# Patient Record
Sex: FEMALE | Race: WHITE | NOT HISPANIC OR LATINO | Employment: FULL TIME | URBAN - METROPOLITAN AREA
[De-identification: names, ages, dates, MRNs, and addresses within clinical notes are randomized per-mention and may not be internally consistent; named-entity substitution may affect disease eponyms.]

---

## 2017-09-15 ENCOUNTER — OFFICE VISIT (OUTPATIENT)
Dept: INTERNAL MEDICINE | Facility: CLINIC | Age: 47
End: 2017-09-15

## 2017-09-15 VITALS
DIASTOLIC BLOOD PRESSURE: 80 MMHG | WEIGHT: 159 LBS | BODY MASS INDEX: 26.49 KG/M2 | SYSTOLIC BLOOD PRESSURE: 122 MMHG | OXYGEN SATURATION: 98 % | HEIGHT: 65 IN | HEART RATE: 95 BPM

## 2017-09-15 DIAGNOSIS — J06.9 ACUTE URI: Primary | ICD-10-CM

## 2017-09-15 DIAGNOSIS — J45.21 MILD INTERMITTENT ASTHMA WITH ACUTE EXACERBATION: ICD-10-CM

## 2017-09-15 PROCEDURE — 99203 OFFICE O/P NEW LOW 30 MIN: CPT | Performed by: NURSE PRACTITIONER

## 2017-09-15 RX ORDER — AZITHROMYCIN 250 MG/1
TABLET, FILM COATED ORAL
Qty: 6 TABLET | Refills: 0 | Status: SHIPPED | OUTPATIENT
Start: 2017-09-15 | End: 2018-05-17

## 2017-09-15 RX ORDER — CETIRIZINE HYDROCHLORIDE 10 MG/1
10 TABLET ORAL DAILY
COMMUNITY
End: 2021-10-26

## 2017-09-15 RX ORDER — ALBUTEROL SULFATE 90 UG/1
2 AEROSOL, METERED RESPIRATORY (INHALATION) EVERY 4 HOURS PRN
Qty: 8 G | Refills: 6 | Status: SHIPPED | OUTPATIENT
Start: 2017-09-15 | End: 2020-08-14 | Stop reason: SDUPTHER

## 2017-09-15 RX ORDER — DIPHENHYDRAMINE HCL 25 MG
25 CAPSULE ORAL EVERY 6 HOURS PRN
COMMUNITY
End: 2017-09-15

## 2017-09-15 RX ORDER — PROMETHAZINE HYDROCHLORIDE AND CODEINE PHOSPHATE 6.25; 1 MG/5ML; MG/5ML
5 SYRUP ORAL EVERY 6 HOURS PRN
Qty: 180 ML | Refills: 0 | Status: SHIPPED | OUTPATIENT
Start: 2017-09-15 | End: 2018-05-17

## 2017-09-15 RX ORDER — PSEUDOEPHEDRINE HCL 30 MG
30 TABLET ORAL EVERY 4 HOURS PRN
COMMUNITY
End: 2018-05-17

## 2017-09-15 RX ORDER — FLUTICASONE PROPIONATE 50 MCG
1 SPRAY, SUSPENSION (ML) NASAL DAILY
COMMUNITY
End: 2021-10-26 | Stop reason: ALTCHOICE

## 2017-09-15 NOTE — PROGRESS NOTES
Subjective   Maranda Gillis is a 47 y.o. female. Patient is here today for   Chief Complaint   Patient presents with   • Allergies     Pt complains of having all-year allergies. Pt has had a productive cough x1 week & she thinks this is allergy related.    .    History of Present Illness   New patient here to establish care.  Health history and questionnaire have been reviewed in its entirety. The patient's previous primary care provider was Lexus SARGENT.  C/o cough x 1 week associated with chest congestion. Her cough is occasionally productive.  Denies shortness of breath or wheezing, although she is using Albuterol every 4 - 6 hours. Denies fever. She has had sick contacts. Feels like it is more in her chest. Not able to sleep due to cough. Cheratussin has helped her to sleep. She had an old prescription. She was on    Singulair in the past, but she started having hallucinations.    The following portions of the patient's history were reviewed and updated as appropriate: allergies, current medications, past family history, past medical history, past social history, past surgical history and problem list.    Review of Systems   Constitutional: Positive for fatigue.   HENT: Positive for congestion. Negative for postnasal drip, sinus pressure and sore throat.    Respiratory: Positive for cough. Negative for shortness of breath and wheezing.    Cardiovascular: Negative.    Gastrointestinal: Negative.        Objective   Vitals:    09/15/17 1257   BP: 122/80   Pulse: 95   SpO2: 98%     Physical Exam   Constitutional: Vital signs are normal. She appears well-developed and well-nourished.   HENT:   Right Ear: Tympanic membrane and ear canal normal.   Left Ear: Tympanic membrane and ear canal normal.   Mouth/Throat: Mucous membranes are normal. Posterior oropharyngeal erythema present.   Cardiovascular: Normal rate, regular rhythm and normal heart sounds.    Pulmonary/Chest: Effort normal and breath sounds normal.    Lymphadenopathy:     She has no cervical adenopathy.   Skin: Skin is warm, dry and intact.   Psychiatric: She has a normal mood and affect. Her speech is normal and behavior is normal. Thought content normal.   Nursing note and vitals reviewed.      Assessment/Plan   Maranda was seen today for allergies.    Diagnoses and all orders for this visit:    Acute URI  -     azithromycin (ZITHROMAX) 250 MG tablet; Take 2 tablets the first day, then 1 tablet daily for 4 days.  -     promethazine-codeine (PHENERGAN with CODEINE) 6.25-10 MG/5ML syrup; Take 5 mL by mouth Every 6 (Six) Hours As Needed for Cough.    Mild intermittent asthma with acute exacerbation  -     beclomethasone (QVAR) 80 MCG/ACT inhaler; Inhale 1 puff 2 (Two) Times a Day.  -     albuterol (PROVENTIL HFA;VENTOLIN HFA) 108 (90 Base) MCG/ACT inhaler; Inhale 2 puffs Every 4 (Four) Hours As Needed for Wheezing.    Patient will need to schedule an appointment for fasting labs and a physical once she is feeling better.

## 2018-05-17 ENCOUNTER — OFFICE VISIT (OUTPATIENT)
Dept: INTERNAL MEDICINE | Facility: CLINIC | Age: 48
End: 2018-05-17

## 2018-05-17 VITALS
BODY MASS INDEX: 28.02 KG/M2 | OXYGEN SATURATION: 98 % | SYSTOLIC BLOOD PRESSURE: 118 MMHG | DIASTOLIC BLOOD PRESSURE: 78 MMHG | TEMPERATURE: 98.7 F | HEART RATE: 91 BPM | WEIGHT: 168.19 LBS | HEIGHT: 65 IN

## 2018-05-17 DIAGNOSIS — J40 BRONCHITIS: ICD-10-CM

## 2018-05-17 DIAGNOSIS — J02.9 SORE THROAT: Primary | ICD-10-CM

## 2018-05-17 LAB
EXPIRATION DATE: NORMAL
INTERNAL CONTROL: NORMAL
Lab: NORMAL
S PYO AG THROAT QL: NEGATIVE

## 2018-05-17 PROCEDURE — 99213 OFFICE O/P EST LOW 20 MIN: CPT | Performed by: NURSE PRACTITIONER

## 2018-05-17 PROCEDURE — 87880 STREP A ASSAY W/OPTIC: CPT | Performed by: NURSE PRACTITIONER

## 2018-05-17 RX ORDER — METHYLPREDNISOLONE 4 MG/1
TABLET ORAL
Qty: 21 TABLET | Refills: 0 | Status: SHIPPED | OUTPATIENT
Start: 2018-05-17 | End: 2019-06-26

## 2018-05-17 RX ORDER — AZITHROMYCIN 250 MG/1
TABLET, FILM COATED ORAL
Qty: 6 TABLET | Refills: 0 | Status: SHIPPED | OUTPATIENT
Start: 2018-05-17 | End: 2019-06-26

## 2018-05-17 NOTE — PROGRESS NOTES
Subjective   Maranda Gillis is a 48 y.o. female. Patient is here today for   Chief Complaint   Patient presents with   • URI     Pt complains of having ST, HA & productive cough x1 week. Pt has been taking IBU, benadryl, flonase & zyrtec.    .    History of Present Illness   C/o sore throat x 1 weeks associated with chest congestion, cough. Cough is occasionally productive. Promethazine with codeine is not helping. She did have allergy testing in the past, but not allergy injections. Qvar daily. Denies wheezing or shortness of breath. Denies fever.    The following portions of the patient's history were reviewed and updated as appropriate: allergies, current medications, past family history, past medical history, past social history, past surgical history and problem list.    Review of Systems   Constitutional: Negative.    HENT: Positive for congestion and sore throat.    Respiratory: Positive for cough.    Cardiovascular: Negative.    Neurological: Positive for headaches.       Objective   Vitals:    05/17/18 0950   BP: 118/78   Pulse: 91   Temp: 98.7 °F (37.1 °C)   SpO2: 98%     Results for orders placed or performed in visit on 05/17/18   POCT rapid strep A   Result Value Ref Range    Rapid Strep A Screen Negative Negative, VALID, INVALID, Not Performed    Internal Control Passed Passed    Lot Number [id number]     Expiration Date 12/31/2019        Physical Exam   Constitutional: She is oriented to person, place, and time. Vital signs are normal. She appears well-developed and well-nourished.   HENT:   Right Ear: Ear canal normal. A middle ear effusion is present.   Left Ear: Ear canal normal. A middle ear effusion is present.   Mouth/Throat: Posterior oropharyngeal erythema present.   Cardiovascular: Normal rate, regular rhythm and normal heart sounds.    Pulmonary/Chest: Effort normal and breath sounds normal.   Neurological: She is alert and oriented to person, place, and time.   Skin: Skin is warm, dry and  intact.   Psychiatric: She has a normal mood and affect. Her speech is normal and behavior is normal. Thought content normal.   Nursing note and vitals reviewed.      Assessment/Plan   Maranda was seen today for uri.    Diagnoses and all orders for this visit:    Sore throat  -     POCT rapid strep A    Bronchitis  -     MethylPREDNISolone (MEDROL) 4 MG tablet; follow package directions  -     HYDROcod Polst-CPM Polst ER (TUSSIONEX PENNKINETIC ER) 10-8 MG/5ML ER suspension; Take 5 mL by mouth Every 12 (Twelve) Hours As Needed for Cough.  -     azithromycin (ZITHROMAX) 250 MG tablet; Take 2 tablets the first day, then 1 tablet daily for 4 days.        Change zyrtec to allegra to help improve allergy symptoms. She may need to follow up with her allergist.     Zithromax sent in to pharmacy for patient to take if her symptoms do not improve over the weekend. She does not need to start it today.

## 2018-09-17 DIAGNOSIS — J45.21 MILD INTERMITTENT ASTHMA WITH ACUTE EXACERBATION: ICD-10-CM

## 2018-09-18 NOTE — TELEPHONE ENCOUNTER
Pts pharmacy called to let us know that they no longer make a QVAR inhaler but to change this to the QVAR ready inhaler.

## 2019-03-07 ENCOUNTER — TELEPHONE (OUTPATIENT)
Dept: INTERNAL MEDICINE | Facility: CLINIC | Age: 49
End: 2019-03-07

## 2019-03-07 DIAGNOSIS — R92.8 ABNORMAL MAMMOGRAM OF LEFT BREAST: Primary | ICD-10-CM

## 2019-03-07 NOTE — TELEPHONE ENCOUNTER
----- Message from Maria Esther Howe MA sent at 3/7/2019  6:15 AM EST -----  Regarding: FW: ORDER  Please advise if this is okay.     ----- Message -----  From: Althea Sanchez RegSched Rep  Sent: 3/6/2019  10:32 AM  To: Maria Esther Howe MA  Subject: ORDER                                            [facility] called stating patient had a mammogram at one of their mobile units in November and they are going to fax over that report.  That study showed a left breast focal asymmetry and she is scheduled for this Friday at 10 am for a left diagnostic mammogram.  They are asking you to put that Order in and fax it to them at [phone] and to put on there U/S and biopsy if needed.

## 2019-03-07 NOTE — TELEPHONE ENCOUNTER
----- Message from ARIE Vasquez sent at 3/7/2019 12:53 PM EST -----  Regarding: RE: ORDER  I placed the order. Why did it take over 3 months for them to send the results? This is why a patient shouldn't be able to get a mammogram without an actual order. I've never even ordered a regular mammogram on her because she's only been seen for acute issues.     ----- Message -----  From: Maria Esther Howe MA  Sent: 3/7/2019   6:15 AM  To: ARIE Vasquez  Subject: FW: ORDER                                        Please advise if this is okay.     ----- Message -----  From: Althea Sanchez RegSched Rep  Sent: 3/6/2019  10:32 AM  To: Maria Esther Howe MA  Subject: ORDER                                            [facility] called stating patient had a mammogram at one of their mobile units in November and they are going to fax over that report.  That study showed a left breast focal asymmetry and she is scheduled for this Friday at 10 am for a left diagnostic mammogram.  They are asking you to put that Order in and fax it to them at [phone] and to put on there U/S and biopsy if needed.

## 2019-06-26 ENCOUNTER — HOSPITAL ENCOUNTER (OUTPATIENT)
Dept: CT IMAGING | Facility: HOSPITAL | Age: 49
Discharge: HOME | End: 2019-06-26
Admitting: NURSE PRACTITIONER

## 2019-06-26 ENCOUNTER — OFFICE VISIT (OUTPATIENT)
Dept: INTERNAL MEDICINE | Facility: CLINIC | Age: 49
End: 2019-06-26

## 2019-06-26 VITALS
BODY MASS INDEX: 28.69 KG/M2 | OXYGEN SATURATION: 98 % | HEART RATE: 91 BPM | TEMPERATURE: 98.4 F | WEIGHT: 172.2 LBS | SYSTOLIC BLOOD PRESSURE: 110 MMHG | HEIGHT: 65 IN | DIASTOLIC BLOOD PRESSURE: 70 MMHG

## 2019-06-26 DIAGNOSIS — R10.9 RIGHT FLANK PAIN: ICD-10-CM

## 2019-06-26 DIAGNOSIS — N20.0 KIDNEY STONE: Primary | ICD-10-CM

## 2019-06-26 DIAGNOSIS — Z00.00 HEALTH CARE MAINTENANCE: ICD-10-CM

## 2019-06-26 DIAGNOSIS — R10.9 ABDOMINAL PAIN, UNSPECIFIED ABDOMINAL LOCATION: ICD-10-CM

## 2019-06-26 LAB
BILIRUB BLD-MCNC: NEGATIVE MG/DL
CLARITY, POC: CLEAR
COLOR UR: YELLOW
GLUCOSE UR STRIP-MCNC: NEGATIVE MG/DL
KETONES UR QL: ABNORMAL
LEUKOCYTE EST, POC: NEGATIVE
NITRITE UR-MCNC: NEGATIVE MG/ML
PH UR: 5.5 [PH] (ref 5–8)
PROT UR STRIP-MCNC: NEGATIVE MG/DL
RBC # UR STRIP: NEGATIVE /UL
SP GR UR: 1.02 (ref 1–1.03)
UROBILINOGEN UR QL: NORMAL

## 2019-06-26 PROCEDURE — 81003 URINALYSIS AUTO W/O SCOPE: CPT | Performed by: NURSE PRACTITIONER

## 2019-06-26 PROCEDURE — 74176 CT ABD & PELVIS W/O CONTRAST: CPT

## 2019-06-26 PROCEDURE — 99213 OFFICE O/P EST LOW 20 MIN: CPT | Performed by: NURSE PRACTITIONER

## 2019-06-27 ENCOUNTER — TELEPHONE (OUTPATIENT)
Dept: OBSTETRICS AND GYNECOLOGY | Facility: CLINIC | Age: 49
End: 2019-06-27

## 2019-06-27 LAB
25(OH)D3+25(OH)D2 SERPL-MCNC: 29 NG/ML (ref 30–100)
ALBUMIN SERPL-MCNC: 4.3 G/DL (ref 3.5–5.2)
ALBUMIN/GLOB SERPL: 1.5 G/DL
ALP SERPL-CCNC: 84 U/L (ref 39–117)
ALT SERPL-CCNC: 17 U/L (ref 1–33)
AMYLASE SERPL-CCNC: 55 U/L (ref 28–100)
AST SERPL-CCNC: 18 U/L (ref 1–32)
BASOPHILS # BLD AUTO: 0.02 10*3/MM3 (ref 0–0.2)
BASOPHILS NFR BLD AUTO: 0.2 % (ref 0–1.5)
BILIRUB SERPL-MCNC: 0.4 MG/DL (ref 0.2–1.2)
BUN SERPL-MCNC: 16 MG/DL (ref 6–20)
BUN/CREAT SERPL: 16.5 (ref 7–25)
CALCIUM SERPL-MCNC: 9.5 MG/DL (ref 8.6–10.5)
CHLORIDE SERPL-SCNC: 103 MMOL/L (ref 98–107)
CHOLEST SERPL-MCNC: 192 MG/DL (ref 0–200)
CO2 SERPL-SCNC: 26.3 MMOL/L (ref 22–29)
CREAT SERPL-MCNC: 0.97 MG/DL (ref 0.57–1)
EOSINOPHIL # BLD AUTO: 0.12 10*3/MM3 (ref 0–0.4)
EOSINOPHIL NFR BLD AUTO: 1.5 % (ref 0.3–6.2)
ERYTHROCYTE [DISTWIDTH] IN BLOOD BY AUTOMATED COUNT: 13.9 % (ref 12.3–15.4)
GLOBULIN SER CALC-MCNC: 2.9 GM/DL
GLUCOSE SERPL-MCNC: 87 MG/DL (ref 65–99)
HCT VFR BLD AUTO: 42.6 % (ref 34–46.6)
HDLC SERPL-MCNC: 89 MG/DL (ref 40–60)
HGB BLD-MCNC: 13.6 G/DL (ref 12–15.9)
IMM GRANULOCYTES # BLD AUTO: 0 10*3/MM3 (ref 0–0.05)
IMM GRANULOCYTES NFR BLD AUTO: 0 % (ref 0–0.5)
LDLC SERPL CALC-MCNC: 81 MG/DL (ref 0–100)
LDLC/HDLC SERPL: 0.91 {RATIO}
LIPASE SERPL-CCNC: 21 U/L (ref 13–60)
LYMPHOCYTES # BLD AUTO: 2.19 10*3/MM3 (ref 0.7–3.1)
LYMPHOCYTES NFR BLD AUTO: 26.5 % (ref 19.6–45.3)
MCH RBC QN AUTO: 31.3 PG (ref 26.6–33)
MCHC RBC AUTO-ENTMCNC: 31.9 G/DL (ref 31.5–35.7)
MCV RBC AUTO: 98.2 FL (ref 79–97)
MONOCYTES # BLD AUTO: 0.61 10*3/MM3 (ref 0.1–0.9)
MONOCYTES NFR BLD AUTO: 7.4 % (ref 5–12)
NEUTROPHILS # BLD AUTO: 5.32 10*3/MM3 (ref 1.7–7)
NEUTROPHILS NFR BLD AUTO: 64.4 % (ref 42.7–76)
PLATELET # BLD AUTO: 362 10*3/MM3 (ref 140–450)
POTASSIUM SERPL-SCNC: 4.6 MMOL/L (ref 3.5–5.2)
PROT SERPL-MCNC: 7.2 G/DL (ref 6–8.5)
RBC # BLD AUTO: 4.34 10*6/MM3 (ref 3.77–5.28)
SODIUM SERPL-SCNC: 140 MMOL/L (ref 136–145)
TRIGL SERPL-MCNC: 110 MG/DL (ref 0–150)
TSH SERPL DL<=0.005 MIU/L-ACNC: 1.66 MIU/ML (ref 0.27–4.2)
VLDLC SERPL CALC-MCNC: 22 MG/DL
WBC # BLD AUTO: 8.26 10*3/MM3 (ref 3.4–10.8)

## 2019-06-27 NOTE — TELEPHONE ENCOUNTER
New patient states her PCP found a cyst on her ovaries and she has had Pain come and go for the last 8 months. Do you want to work her in or wait til the next available?

## 2019-06-28 ENCOUNTER — TELEPHONE (OUTPATIENT)
Dept: INTERNAL MEDICINE | Facility: CLINIC | Age: 49
End: 2019-06-28

## 2019-06-28 DIAGNOSIS — R10.9 ABDOMINAL PAIN, UNSPECIFIED ABDOMINAL LOCATION: Primary | ICD-10-CM

## 2019-06-28 LAB
BACTERIA UR CULT: NORMAL
BACTERIA UR CULT: NORMAL

## 2019-06-28 NOTE — TELEPHONE ENCOUNTER
Suzanna Melo referred patient to GYN upstairs. Patient states they cannot see her until September.     [facility] I explained they have plenty of physicians in that office and that they may be able to get her in sooner.    Patient will call me once she has an appointment scheduled so I can fax CT scan to GYN.

## 2019-07-17 ENCOUNTER — PROCEDURE VISIT (OUTPATIENT)
Dept: OBSTETRICS AND GYNECOLOGY | Facility: CLINIC | Age: 49
End: 2019-07-17

## 2019-07-17 ENCOUNTER — OFFICE VISIT (OUTPATIENT)
Dept: OBSTETRICS AND GYNECOLOGY | Facility: CLINIC | Age: 49
End: 2019-07-17

## 2019-07-17 VITALS
WEIGHT: 170 LBS | BODY MASS INDEX: 28.32 KG/M2 | SYSTOLIC BLOOD PRESSURE: 110 MMHG | HEIGHT: 65 IN | DIASTOLIC BLOOD PRESSURE: 70 MMHG

## 2019-07-17 DIAGNOSIS — R10.2 PELVIC PAIN: Primary | ICD-10-CM

## 2019-07-17 DIAGNOSIS — R10.2 PELVIC PAIN IN FEMALE: Primary | ICD-10-CM

## 2019-07-17 DIAGNOSIS — N83.202 CYST OF LEFT OVARY: ICD-10-CM

## 2019-07-17 PROCEDURE — 76830 TRANSVAGINAL US NON-OB: CPT | Performed by: OBSTETRICS & GYNECOLOGY

## 2019-07-17 PROCEDURE — 99204 OFFICE O/P NEW MOD 45 MIN: CPT | Performed by: OBSTETRICS & GYNECOLOGY

## 2019-07-17 NOTE — PROGRESS NOTES
New GYN Exam    CC- Here for pelvic pain    Maranda Gillis is a 49 y.o. female new patient who presents for pelvic pain.  She has irregular cycles and will occasionally skip a month. She has had pelvic pain for the pst 8-9 months. This pain is low in her pelvis and radiates downwards. It has happened 3 times. The pain is severe and lasts for about 1 hour. The past time it happened it lasted for a few days. She had a CT scan on 19 showed a 4.1 cm L adnexal mass. US today shows a 9.3 cm uterus with EL 0.6 cm and a L ovarian cyst 3.8 x 3.3 x 2.5 cm and has complex features c/w hemorrhagic cyst.  She has not had a pap in > 3 years. She had migraines with OCPS.    OB History      Para Term  AB Living    2 1 1   1 1    SAB TAB Ectopic Molar Multiple Live Births    1                  Obstetric Comments    1   1 SAB no D&C          Menarche:12  Menopause:not yet  HRT:none  Current contraception: none  History of abnormal Pap smear: no  History of abnormal mammogram: no  Family history of uterine, colon or ovarian cancer: no  Family history of breast cancer: no  STD's: none  Last pap smear: > 3 years ago  Gardasil: none  PAUL: none      Health Maintenance   Topic Date Due   • ANNUAL PHYSICAL  1971   • TDAP/TD VACCINES (1 - Tdap) 1987   • PAP SMEAR  09/15/2017   • COLONOSCOPY  09/15/2017   • ZOSTER VACCINE (1 of 2) 2018   • INFLUENZA VACCINE  2019   • MAMMOGRAM  2020       Past Medical History:   Diagnosis Date   • Allergic    • Migraine     with OCPS   • Ovarian cyst        Past Surgical History:   Procedure Laterality Date   • BUNIONECTOMY      bilateral         Current Outpatient Medications:   •  albuterol (PROVENTIL HFA;VENTOLIN HFA) 108 (90 Base) MCG/ACT inhaler, Inhale 2 puffs Every 4 (Four) Hours As Needed for Wheezing., Disp: 8 g, Rfl: 6  •  Beclomethasone Diprop HFA (QVAR REDIHALER) 80 MCG/ACT inhaler, Inhale 1 puff 2 (Two) Times a Day., Disp: 10.6 g, Rfl: 2  •   "cetirizine (zyrTEC) 10 MG tablet, Take 10 mg by mouth Daily., Disp: , Rfl:   •  fluticasone (FLONASE) 50 MCG/ACT nasal spray, 1 spray into each nostril Daily., Disp: , Rfl:     Allergies   Allergen Reactions   • Guaifenesin-Codeine Other (See Comments)     \"I cant take that because it causes severe coughing fits and insomnia\"   • Other      ALLERGIC TO WEEDS, MOLDS, DUST MITES, GRASS, TREES, CATS   • Singulair [Montelukast Sodium] Hallucinations       Social History     Tobacco Use   • Smoking status: Never Smoker   • Smokeless tobacco: Never Used   Substance Use Topics   • Alcohol use: Yes     Comment: SOCIALLY   • Drug use: No       Family History   Problem Relation Age of Onset   • Diabetes Mother    • Melanoma Mother    • COPD Father    • Emphysema Father    • Asthma Sister    • Breast cancer Neg Hx    • Ovarian cancer Neg Hx    • Colon cancer Neg Hx    • Deep vein thrombosis Neg Hx    • Pulmonary embolism Neg Hx        Review of Systems   Constitutional: Negative for appetite change, fatigue, fever and unexpected weight change.   Eyes: Negative for photophobia and visual disturbance.   Respiratory: Negative for cough and shortness of breath.    Cardiovascular: Negative for chest pain and palpitations.   Gastrointestinal: Positive for abdominal pain. Negative for abdominal distention, constipation, diarrhea and nausea.   Endocrine: Negative for cold intolerance and heat intolerance.   Genitourinary: Positive for pelvic pain. Negative for dyspareunia, dysuria, menstrual problem and vaginal discharge.   Musculoskeletal: Positive for back pain.   Skin: Negative for color change and rash.   Neurological: Negative for headaches.   Hematological: Negative for adenopathy. Does not bruise/bleed easily.   Psychiatric/Behavioral: Negative for dysphoric mood. The patient is not nervous/anxious.        /70   Ht 165.1 cm (65\")   Wt 77.1 kg (170 lb)   LMP 07/15/2019   BMI 28.29 kg/m²     Physical Exam "   Constitutional: She is oriented to person, place, and time. She appears well-developed and well-nourished.   HENT:   Head: Normocephalic and atraumatic.   Eyes: Conjunctivae are normal. No scleral icterus.   Neck: Neck supple. No thyromegaly present.   Cardiovascular: Normal rate, regular rhythm and normal heart sounds.   Pulmonary/Chest: Effort normal and breath sounds normal.   Abdominal: Soft. Bowel sounds are normal. She exhibits no distension and no mass. There is tenderness (TTP LLQ ). There is no rebound and no guarding. No hernia.   Neurological: She is alert and oriented to person, place, and time.   Skin: Skin is warm and dry.   Psychiatric: She has a normal mood and affect. Her behavior is normal. Judgment and thought content normal.   Nursing note and vitals reviewed.         Assessment/Plan  1) Pelvic pain- L ovarian cyst on US. Pt has been having this pain > 8 months and it tends to come and go but has worsened recently. Pt has a complex L adnexal mass. RTO 6 weeks for repeat US and visit. If the patient still has a cyst at that time, consider dx lsc and ovarian cystectomy vs. LSO. Call sooner for any worsening pain.   2. RHM- RTO 6 weeks and will do annual as well.   3. H/O abnormal MMG- had normal f/u 11/2018 [facility].       Maranda was seen today for abdominal pain.    Diagnoses and all orders for this visit:    Pelvic pain    Cyst of left ovary        Akila Juares MD  7/17/19  7:19 PM

## 2019-07-28 PROBLEM — R10.2 PELVIC PAIN: Status: ACTIVE | Noted: 2019-07-28

## 2019-07-28 PROBLEM — G43.909 MIGRAINES: Status: ACTIVE | Noted: 2019-07-28

## 2019-07-28 PROBLEM — N83.202 CYST OF LEFT OVARY: Status: ACTIVE | Noted: 2019-07-28

## 2019-08-28 ENCOUNTER — OFFICE VISIT (OUTPATIENT)
Dept: OBSTETRICS AND GYNECOLOGY | Facility: CLINIC | Age: 49
End: 2019-08-28

## 2019-08-28 ENCOUNTER — PROCEDURE VISIT (OUTPATIENT)
Dept: OBSTETRICS AND GYNECOLOGY | Facility: CLINIC | Age: 49
End: 2019-08-28

## 2019-08-28 VITALS
WEIGHT: 176 LBS | SYSTOLIC BLOOD PRESSURE: 122 MMHG | BODY MASS INDEX: 29.32 KG/M2 | HEIGHT: 65 IN | DIASTOLIC BLOOD PRESSURE: 70 MMHG

## 2019-08-28 DIAGNOSIS — Z13.9 SCREENING FOR CONDITION: ICD-10-CM

## 2019-08-28 DIAGNOSIS — Z11.51 SPECIAL SCREENING EXAMINATION FOR HUMAN PAPILLOMAVIRUS (HPV): ICD-10-CM

## 2019-08-28 DIAGNOSIS — Z01.419 ENCOUNTER FOR WELL WOMAN EXAM: ICD-10-CM

## 2019-08-28 DIAGNOSIS — Z01.419 PAP SMEAR, LOW-RISK: Primary | ICD-10-CM

## 2019-08-28 DIAGNOSIS — N83.202 CYST OF LEFT OVARY: ICD-10-CM

## 2019-08-28 DIAGNOSIS — N83.202 CYST OF LEFT OVARY: Primary | ICD-10-CM

## 2019-08-28 DIAGNOSIS — N95.1 HOT FLASHES DUE TO MENOPAUSE: ICD-10-CM

## 2019-08-28 LAB
B-HCG UR QL: NEGATIVE
BILIRUB BLD-MCNC: NEGATIVE MG/DL
CLARITY, POC: CLEAR
COLOR UR: YELLOW
GLUCOSE UR STRIP-MCNC: NEGATIVE MG/DL
INTERNAL NEGATIVE CONTROL: NEGATIVE
INTERNAL POSITIVE CONTROL: POSITIVE
KETONES UR QL: NEGATIVE
LEUKOCYTE EST, POC: NEGATIVE
Lab: NORMAL
NITRITE UR-MCNC: NEGATIVE MG/ML
PH UR: 5 [PH] (ref 5–8)
PROT UR STRIP-MCNC: NEGATIVE MG/DL
RBC # UR STRIP: NEGATIVE /UL
SP GR UR: 1 (ref 1–1.03)
UROBILINOGEN UR QL: NORMAL

## 2019-08-28 PROCEDURE — 76830 TRANSVAGINAL US NON-OB: CPT | Performed by: OBSTETRICS & GYNECOLOGY

## 2019-08-28 PROCEDURE — 99213 OFFICE O/P EST LOW 20 MIN: CPT | Performed by: OBSTETRICS & GYNECOLOGY

## 2019-08-28 PROCEDURE — 81025 URINE PREGNANCY TEST: CPT | Performed by: OBSTETRICS & GYNECOLOGY

## 2019-08-28 PROCEDURE — 81002 URINALYSIS NONAUTO W/O SCOPE: CPT | Performed by: OBSTETRICS & GYNECOLOGY

## 2019-08-28 PROCEDURE — 99396 PREV VISIT EST AGE 40-64: CPT | Performed by: OBSTETRICS & GYNECOLOGY

## 2019-08-28 NOTE — PROGRESS NOTES
GYN Annual Exam     CC- Here for annual exam.     Maranda Gillis is a 49 y.o. female established patient who presents for annual well woman exam.  She has not had a period for several months.  Her left lower quadrant pain has resolved. Her US today shows a 3 x 2.6 x 2.2 cm hypoechoic area on the L ovary with a single septation.  This is smaller than the ultrasound on 2019.  We discussed that this is still large enough and it needs to be followed over time.  The patient has started having hot flashes and night sweats.  She is interested in trying over-the-counter methods and we discussed Estroven and evening primrose oil as options.  She did have migraines with birth control pills and so is probably not a great candidate for hormone replacement therapy.      OB History      Para Term  AB Living    2 1 1   1 1    SAB TAB Ectopic Molar Multiple Live Births    1                  Obstetric Comments    1   1 SAB no D&C          Menarche:12  Menopause:not yet  HRT:none  Current contraception: none  History of abnormal Pap smear: no   History of abnormal mammogram: no  Family history of uterine, colon or ovarian cancer: no  Family history of breast cancer: no  STD's: none   Last pap smear: > 3 years  Gardasil: none  PAUL: none      Health Maintenance   Topic Date Due   • Annual Gynecologic Pelvic and Breast Exam  1968   • ANNUAL PHYSICAL  1971   • TDAP/TD VACCINES (1 - Tdap) 1987   • PAP SMEAR  09/15/2017   • COLONOSCOPY  09/15/2017   • ZOSTER VACCINE (1 of 2) 2018   • INFLUENZA VACCINE  2019   • MAMMOGRAM  2020       Past Medical History:   Diagnosis Date   • Allergic    • Migraine     with OCPS   • Ovarian cyst    • Vitamin D deficiency        Past Surgical History:   Procedure Laterality Date   • BUNIONECTOMY      bilateral   • SKIN LESION EXCISION           Current Outpatient Medications:   •  albuterol (PROVENTIL HFA;VENTOLIN HFA) 108 (90 Base) MCG/ACT inhaler, Inhale  "2 puffs Every 4 (Four) Hours As Needed for Wheezing., Disp: 8 g, Rfl: 6  •  Beclomethasone Diprop HFA (QVAR REDIHALER) 80 MCG/ACT inhaler, Inhale 1 puff 2 (Two) Times a Day., Disp: 10.6 g, Rfl: 2  •  cetirizine (zyrTEC) 10 MG tablet, Take 10 mg by mouth Daily., Disp: , Rfl:   •  fluticasone (FLONASE) 50 MCG/ACT nasal spray, 1 spray into each nostril Daily., Disp: , Rfl:     Allergies   Allergen Reactions   • Guaifenesin-Codeine Other (See Comments)     \"I cant take that because it causes severe coughing fits and insomnia\"   • Other      ALLERGIC TO WEEDS, MOLDS, DUST MITES, GRASS, TREES, CATS   • Singulair [Montelukast Sodium] Hallucinations       Social History     Tobacco Use   • Smoking status: Never Smoker   • Smokeless tobacco: Never Used   Substance Use Topics   • Alcohol use: Yes     Comment: SOCIALLY   • Drug use: No       Family History   Problem Relation Age of Onset   • Diabetes Mother    • Melanoma Mother    • COPD Father    • Emphysema Father    • Asthma Sister    • Breast cancer Neg Hx    • Ovarian cancer Neg Hx    • Colon cancer Neg Hx    • Deep vein thrombosis Neg Hx    • Pulmonary embolism Neg Hx        Review of Systems   Constitutional: Negative for appetite change, fatigue, fever and unexpected weight change.   Eyes: Negative for photophobia and visual disturbance.   Respiratory: Negative for cough and shortness of breath.    Cardiovascular: Negative for chest pain and palpitations.   Gastrointestinal: Negative for abdominal distention, abdominal pain, constipation, diarrhea and nausea.   Endocrine: Positive for heat intolerance. Negative for cold intolerance.   Genitourinary: Negative for dyspareunia, dysuria, menstrual problem, pelvic pain, vaginal bleeding and vaginal discharge.   Musculoskeletal: Negative for back pain.   Skin: Negative for color change and rash.   Neurological: Negative for headaches.   Hematological: Negative for adenopathy. Does not bruise/bleed easily. " "  Psychiatric/Behavioral: Positive for sleep disturbance (night sweats). Negative for dysphoric mood. The patient is not nervous/anxious.        /70   Ht 165.1 cm (65\")   Wt 79.8 kg (176 lb)   LMP 07/01/2019   Breastfeeding? No   BMI 29.29 kg/m²     Physical Exam   Constitutional: She is oriented to person, place, and time. She appears well-developed and well-nourished.   HENT:   Head: Normocephalic and atraumatic.   Eyes: Conjunctivae are normal. No scleral icterus.   Neck: Neck supple. No thyromegaly present.   Cardiovascular: Normal rate and regular rhythm.   Pulmonary/Chest: Effort normal and breath sounds normal. Right breast exhibits no inverted nipple, no mass, no nipple discharge, no skin change and no tenderness. Left breast exhibits skin change. Left breast exhibits no inverted nipple, no mass, no nipple discharge and no tenderness.       Abdominal: Soft. Bowel sounds are normal. She exhibits no distension and no mass. There is no tenderness. There is no rebound and no guarding. No hernia.   Genitourinary: Pelvic exam was performed with patient supine. There is no rash, tenderness or lesion on the right labia. There is no rash, tenderness or lesion on the left labia. Uterus is not deviated, not enlarged, not fixed and not tender. Cervix exhibits no motion tenderness, no discharge and no friability. Right adnexum displays no mass, no tenderness and no fullness. Left adnexum displays no mass, no tenderness and no fullness. No erythema, tenderness or bleeding in the vagina. No foreign body in the vagina. No signs of injury around the vagina. No vaginal discharge found.   Neurological: She is alert and oriented to person, place, and time.   Skin: Skin is warm and dry.   Psychiatric: She has a normal mood and affect. Her behavior is normal. Judgment and thought content normal.   Nursing note and vitals reviewed.         Assessment/Plan    1) GYN HM: pap/HPV  SBE demonstrated and encouraged.  2) STD " screening: declines Condoms encouraged.  3) Bone health - Weight bearing exercise, dietary calcium recommendations and vitamin D reviewed.   4) Diet and Exercise discussed  5) Smoking Status: No  6) L ovarian cyst-patient has been asymptomatic and the cyst is decreasing in size.  Will return office in 2 months for repeat ultrasound and visit.  Call for any increase in pain  7)MMG: UTD 11/2018, sched 11/2019  8) DEXA-plan age 53  9)C scope- due, will refer   10) Hot flashes-patient will try lifestyle modifications and over-the-counter therapy such as Estroven.  We will follow-up with symptoms in 2 months.  Patient not a good candidate for HRT as she had migraines with birth control pills.  May be a good SSRI candidate if her symptoms persist.   Follow up prn and 1 year       Maranda was seen today for gynecologic exam.    Diagnoses and all orders for this visit:    Pap smear, low-risk  -     POC Urinalysis Dipstick  -     POC Pregnancy, Urine  -     Pap IG, HPV-hr    Encounter for well woman exam  -     POC Urinalysis Dipstick  -     POC Pregnancy, Urine  -     Pap IG, HPV-hr    Special screening examination for human papillomavirus (HPV)  -     POC Urinalysis Dipstick  -     POC Pregnancy, Urine  -     Pap IG, HPV-hr    Cyst of left ovary    Hot flashes due to menopause    Screening for condition  -     Mammo Screening Bilateral With CAD; Future  -     Ambulatory Referral For Screening Colonoscopy        Akila Juares MD   8/28/19    9:23 AM

## 2019-08-30 LAB
CYTOLOGIST CVX/VAG CYTO: NORMAL
CYTOLOGY CVX/VAG DOC CYTO: NORMAL
CYTOLOGY CVX/VAG DOC THIN PREP: NORMAL
DX ICD CODE: NORMAL
HIV 1 & 2 AB SER-IMP: NORMAL
HPV I/H RISK 1 DNA CVX QL PROBE+SIG AMP: NEGATIVE
OTHER STN SPEC: NORMAL
STAT OF ADQ CVX/VAG CYTO-IMP: NORMAL

## 2019-09-24 ENCOUNTER — TELEPHONE (OUTPATIENT)
Dept: GASTROENTEROLOGY | Facility: CLINIC | Age: 49
End: 2019-09-24

## 2019-09-24 DIAGNOSIS — Z12.11 COLON CANCER SCREENING: Primary | ICD-10-CM

## 2019-09-24 DIAGNOSIS — Z83.71 FAMILY HISTORY OF COLONIC POLYPS: ICD-10-CM

## 2019-09-24 NOTE — TELEPHONE ENCOUNTER
Received fast track for colonoscopy. Sent to provider for review. Patient did make unintentional weight loss on form. Will review with patient.

## 2019-09-25 PROBLEM — Z83.719 FAMILY HISTORY OF COLONIC POLYPS: Status: ACTIVE | Noted: 2019-09-25

## 2019-09-25 PROBLEM — Z12.11 COLON CANCER SCREENING: Status: ACTIVE | Noted: 2019-09-25

## 2019-09-25 PROBLEM — Z83.71 FAMILY HISTORY OF COLONIC POLYPS: Status: ACTIVE | Noted: 2019-09-25

## 2019-09-25 RX ORDER — SODIUM PICOSULFATE, MAGNESIUM OXIDE, AND ANHYDROUS CITRIC ACID 10; 3.5; 12 MG/160ML; G/160ML; G/160ML
1 LIQUID ORAL 2 TIMES DAILY
Qty: 320 ML | Refills: 0 | Status: ON HOLD | OUTPATIENT
Start: 2019-09-25 | End: 2019-10-14

## 2019-09-25 NOTE — TELEPHONE ENCOUNTER
Patient accidentally checked the weight loss box.    emailed instructions    Dr. Claudette Liu   Date: _____10/14/19_________     Arrival Time: __10:15am_______    Hospital:  [facility] ([address] [place] [address] [place] [address]) (back of hospital at the emergency room)        Please call the office as soon as possible if you need to reschedule or cancel your procedure please give two weeks’ notice.  If you do not show up or frequently reschedule your procedure, your provider has the option of not rescheduling.    Stop the following medications 5 days prior to your procedure- check with the prescribing doctor prior to holding.  No Aspirin, Advil, Aleve, Motrin, IBU or anything listed on the back of this form.    If you are taking any medications on the attached list and/or pills that contain iron please stop them one week prior. Insulin will be addressed separately.    1.  your prescription AND a 10 oz. bottle of Magnesium Citrate (over the counter) as soon as possible. Do not wait until the day before in case of issues with cost or etc.    The day before your procedure  It is very important that you follow the instructions on this form and not on the prep you were prescribed.    You will be on a clear liquid diet only which may include coffee , tea, soft drinks, broth(chicken beef or vegetable), popsicles, Jell-O, Gatorade, juice (no orange, grapefruit or V-8).  ®No red or purple dyes and no dairy or non-dairy.    2. At 8:00 am drink the bottle of magnesium citrate.  Drink plenty of fluids in between    3. At    2:00 pm drink first dose or ½ of prep, mix as directed on container/box    Drink plenty of fluids between    4. At   10:00 pm      drink second dose or ½ of prep, mix as directed on container/box    5. If you start to get nauseated while drinking your prep try stepping away for 15-20 min. then resume again at a slower pace.    You may have clear liquids only up to 4 hours before your procedure.   No gum or candy!     No smoking or tobacco products!    You may only take your morning prescription blood pressure (no diuretic combinations), breathing, seizure, psych or heart medications.     You will need a  to accompany you for your exam. Bus or uber not allowed The average time spent in our facility is around 2-3 hours.  You are not to drive, operate machinery or make legal decisions the remainder of the day following you procedure.    Please call [phone]@ [phone] if you have questions about any of this information.  If it is after hours or the weekend and you need to reach the doctor or have questions for the office please call [phone].     It is your responsibility to check with your insurance company to determine benefits and out of pocket costs.      Avoid these medications 5-7 days prior to surgery  Please check with your prescribing doctor before stopping any medications  NSAIDS- Advil, Aleve, Motrin, Ibuprofen, Midol, Excedrin, Fiorinal, [place]  (Some cold medications may have these in them)    All herbal medications- iron, vitamin E, fish oil, decongestants (phenylephrine, pseudoephedrine), ginkgo, garlic, ginseng, Pramod’s wart    Mobic (meloxicam), Celebrex, Diclofenac (Voltaren), Nambumetone (Relafen), Daypro, Naproxen, Sulindac, Indomethacin, Toradol, Feldine, Salsalate, Etodolac (Lodine),     Viagra, Cialis, Levitra    Aspirin, Plavix (clopidogrel), Effient, Pletal, Coumadin, Pradaxa, Brilinta, Ticlide, Eliquis, (Xaralto- 3 days)      Diet pills -Adipex (phentermine) -2 weeks prior

## 2019-09-30 ENCOUNTER — PREP FOR SURGERY (OUTPATIENT)
Dept: OTHER | Facility: HOSPITAL | Age: 49
End: 2019-09-30

## 2019-10-11 ENCOUNTER — ANESTHESIA EVENT (OUTPATIENT)
Dept: PERIOP | Facility: HOSPITAL | Age: 49
End: 2019-10-11

## 2019-10-14 ENCOUNTER — HOSPITAL ENCOUNTER (OUTPATIENT)
Facility: HOSPITAL | Age: 49
Setting detail: HOSPITAL OUTPATIENT SURGERY
Discharge: HOME | End: 2019-10-14
Attending: INTERNAL MEDICINE | Admitting: INTERNAL MEDICINE

## 2019-10-14 ENCOUNTER — ANESTHESIA (OUTPATIENT)
Dept: PERIOP | Facility: HOSPITAL | Age: 49
End: 2019-10-14

## 2019-10-14 VITALS
WEIGHT: 178.2 LBS | HEART RATE: 64 BPM | SYSTOLIC BLOOD PRESSURE: 101 MMHG | DIASTOLIC BLOOD PRESSURE: 89 MMHG | OXYGEN SATURATION: 98 % | TEMPERATURE: 98.2 F | RESPIRATION RATE: 16 BRPM | BODY MASS INDEX: 29.69 KG/M2 | HEIGHT: 65 IN

## 2019-10-14 DIAGNOSIS — Z12.11 COLON CANCER SCREENING: ICD-10-CM

## 2019-10-14 DIAGNOSIS — Z83.71 FAMILY HISTORY OF COLONIC POLYPS: ICD-10-CM

## 2019-10-14 PROCEDURE — 88305 TISSUE EXAM BY PATHOLOGIST: CPT | Performed by: INTERNAL MEDICINE

## 2019-10-14 PROCEDURE — 25010000002 PROPOFOL 10 MG/ML EMULSION: Performed by: ANESTHESIOLOGY

## 2019-10-14 PROCEDURE — 45380 COLONOSCOPY AND BIOPSY: CPT | Performed by: INTERNAL MEDICINE

## 2019-10-14 RX ORDER — PROPOFOL 10 MG/ML
VIAL (ML) INTRAVENOUS AS NEEDED
Status: DISCONTINUED | OUTPATIENT
Start: 2019-10-14 | End: 2019-10-14 | Stop reason: SURG

## 2019-10-14 RX ORDER — SODIUM CHLORIDE 0.9 % (FLUSH) 0.9 %
1-10 SYRINGE (ML) INJECTION AS NEEDED
Status: DISCONTINUED | OUTPATIENT
Start: 2019-10-14 | End: 2019-10-14 | Stop reason: HOSPADM

## 2019-10-14 RX ORDER — GLYCOPYRROLATE 0.2 MG/ML
INJECTION INTRAMUSCULAR; INTRAVENOUS AS NEEDED
Status: DISCONTINUED | OUTPATIENT
Start: 2019-10-14 | End: 2019-10-14 | Stop reason: SURG

## 2019-10-14 RX ORDER — SODIUM CHLORIDE 0.9 % (FLUSH) 0.9 %
3 SYRINGE (ML) INJECTION EVERY 12 HOURS SCHEDULED
Status: DISCONTINUED | OUTPATIENT
Start: 2019-10-14 | End: 2019-10-14 | Stop reason: HOSPADM

## 2019-10-14 RX ORDER — LIDOCAINE HYDROCHLORIDE 20 MG/ML
INJECTION, SOLUTION INFILTRATION; PERINEURAL AS NEEDED
Status: DISCONTINUED | OUTPATIENT
Start: 2019-10-14 | End: 2019-10-14 | Stop reason: SURG

## 2019-10-14 RX ORDER — SODIUM CHLORIDE, SODIUM LACTATE, POTASSIUM CHLORIDE, CALCIUM CHLORIDE 600; 310; 30; 20 MG/100ML; MG/100ML; MG/100ML; MG/100ML
9 INJECTION, SOLUTION INTRAVENOUS CONTINUOUS
Status: DISCONTINUED | OUTPATIENT
Start: 2019-10-14 | End: 2019-10-14 | Stop reason: HOSPADM

## 2019-10-14 RX ORDER — SODIUM CHLORIDE 9 MG/ML
40 INJECTION, SOLUTION INTRAVENOUS AS NEEDED
Status: DISCONTINUED | OUTPATIENT
Start: 2019-10-14 | End: 2019-10-14 | Stop reason: HOSPADM

## 2019-10-14 RX ORDER — LIDOCAINE HYDROCHLORIDE 10 MG/ML
0.5 INJECTION, SOLUTION EPIDURAL; INFILTRATION; INTRACAUDAL; PERINEURAL ONCE AS NEEDED
Status: COMPLETED | OUTPATIENT
Start: 2019-10-14 | End: 2019-10-14

## 2019-10-14 RX ADMIN — PROPOFOL 340 MG: 10 INJECTION, EMULSION INTRAVENOUS at 11:06

## 2019-10-14 RX ADMIN — LIDOCAINE HYDROCHLORIDE 100 MG: 20 INJECTION, SOLUTION INFILTRATION; PERINEURAL at 11:06

## 2019-10-14 RX ADMIN — LIDOCAINE HYDROCHLORIDE 0.2 ML: 10 INJECTION, SOLUTION EPIDURAL; INFILTRATION; INTRACAUDAL; PERINEURAL at 10:30

## 2019-10-14 RX ADMIN — GLYCOPYRROLATE 0.1 MG: 0.2 INJECTION INTRAMUSCULAR; INTRAVENOUS at 11:04

## 2019-10-14 RX ADMIN — SODIUM CHLORIDE, POTASSIUM CHLORIDE, SODIUM LACTATE AND CALCIUM CHLORIDE 9 ML/HR: 600; 310; 30; 20 INJECTION, SOLUTION INTRAVENOUS at 10:30

## 2019-10-14 NOTE — H&P
[facility]  Pre-procedure H&P    Chief Complaint: Screening colonoscopy, family history of polyps  Maranda Gillis is a 49 y.o. female  who is referred by Claudette Liu MD for a colonoscopy. She is an Asymptomatic person Age 50 years and older who has a history of screening for colon cancer.   She has had no previous colonoscopies.    She denies any change in bowel function, melena, hematochezia or unexplained weight loss.    Twelve point ROS performed and all are negative    Past Medical History:   Diagnosis Date   • Allergic    • Migraine     with OCPS   • Ovarian cyst    • Vitamin D deficiency        Past Surgical History:   Procedure Laterality Date   • BUNIONECTOMY      bilateral   • SKIN LESION EXCISION         Family History   Problem Relation Age of Onset   • Diabetes Mother    • Melanoma Mother    • COPD Father    • Emphysema Father    • Asthma Sister    • Breast cancer Neg Hx    • Ovarian cancer Neg Hx    • Colon cancer Neg Hx    • Deep vein thrombosis Neg Hx    • Pulmonary embolism Neg Hx        Social History     Socioeconomic History   • Marital status:      Spouse name: Not on file   • Number of children: Not on file   • Years of education: Not on file   • Highest education level: Not on file   Tobacco Use   • Smoking status: Never Smoker   • Smokeless tobacco: Never Used   Substance and Sexual Activity   • Alcohol use: Yes     Comment: SOCIALLY   • Drug use: No   • Sexual activity: Yes     Partners: Male     Birth control/protection: None         Current Facility-Administered Medications:   •  lactated ringers infusion, 9 mL/hr, Intravenous, Continuous, Henry Callahan CRNA, Last Rate: 9 mL/hr at 10/14/19 1030, 9 mL/hr at 10/14/19 1030  •  sodium chloride 0.9 % flush 1-10 mL, 1-10 mL, Intravenous, PRN, Henry Callahan CRNA  •  sodium chloride 0.9 % flush 3 mL, 3 mL, Intravenous, Q12H, Henry Callahan CRNA  •  sodium chloride 0.9 % infusion 40 mL, 40 mL,  "Intravenous, PRN, [place], Henry Bailee, CRNA  No current facility-administered medications on file prior to encounter.      Current Outpatient Medications on File Prior to Encounter   Medication Sig Dispense Refill   • Rhubarb (ESTROVEN MENOPAUSE RELIEF) 4 MG tablet Take 4 mg by mouth.     • albuterol (PROVENTIL HFA;VENTOLIN HFA) 108 (90 Base) MCG/ACT inhaler Inhale 2 puffs Every 4 (Four) Hours As Needed for Wheezing. 8 g 6   • Beclomethasone Diprop HFA (QVAR REDIHALER) 80 MCG/ACT inhaler Inhale 1 puff 2 (Two) Times a Day. 10.6 g 2   • cetirizine (zyrTEC) 10 MG tablet Take 10 mg by mouth Daily.     • fluticasone (FLONASE) 50 MCG/ACT nasal spray 1 spray into each nostril Daily.     • [DISCONTINUED] CLENPIQ 10-3.5-12 MG-GM -GM/160ML solution Take 1 bottle by mouth 2 (Two) Times a Day. RUN COUPON ATTACHED 320 mL 0       Allergies   Allergen Reactions   • Guaifenesin-Codeine Other (See Comments)     \"I cant take that because it causes severe coughing fits and insomnia\"   • Other      ALLERGIC TO WEEDS, MOLDS, DUST MITES, GRASS, TREES, CATS   • Singulair [Montelukast Sodium] Hallucinations            Vitals:    10/14/19 1026   BP: 112/71   Pulse: 75   Resp: 14   Temp: 98.2 °F (36.8 °C)   SpO2: 98%       Physical Exam:   General: patient awake, alert and cooperative   Eyes: Normal lids and lashes, no scleral icterus   Neck: supple, normal ROM   Skin: warm and dry, not jaundiced   Cardiovascular: regular rhythm and rate, no murmurs auscultated   Pulm: clear to auscultation bilaterally, regular and unlabored   Abdomen: soft, nontender, nondistended; normal bowel sounds   Extremities: no rash or edema   Psychiatric: Normal mood and behavior    Debilities: None         Assessment/Plan       No diagnosis found.    Schedule for colonoscopy.      Indications, risks and procedure were discussed with the patient, including but not limited to, bleeding, infection, possibility of perforation and possible polypectomy. All of the " patient's questions were answered, and signed informed consent was obtained and placed on the chart.         Claudette Liu MD  [facility]  [unfilled]

## 2019-10-14 NOTE — ANESTHESIA PREPROCEDURE EVALUATION
Anesthesia Evaluation     Patient summary reviewed and Nursing notes reviewed   no history of anesthetic complications:  NPO Solid Status: > 8 hours  NPO Liquid Status: > 2 hours           Airway   Mallampati: II  TM distance: >3 FB  Neck ROM: full  No difficulty expected  Dental      Pulmonary     breath sounds clear to auscultation  Cardiovascular - negative cardio ROS  Exercise tolerance: good (4-7 METS)    Rhythm: regular  Rate: normal        Neuro/Psych  (+) headaches ( Migraines- wiht OCPS),     GI/Hepatic/Renal/Endo - negative ROS     Musculoskeletal (-) negative ROS    Abdominal    Substance History   (+) alcohol use (2x per week),      OB/GYN      Comment: Cyst of left ovary  Pelvic pain          Other - negative ROS       ROS/Med Hx Other: CL finished at 1am                  Anesthesia Plan    ASA 2     MAC     intravenous induction   Anesthetic plan, all risks, benefits, and alternatives have been provided, discussed and informed consent has been obtained with: patient.  Use of blood products discussed with patient  Consented to blood products.

## 2019-10-14 NOTE — OP NOTE
Patient Name:  Maranda Gillis  YOB: 1970    Date of Procedure:  10/14/2019    Procedure Performed: colonoscopy     Indications:  Screening colonoscopy, family history of polyps  Pre-op Diagnosis:   Colon cancer screening [Z12.11]  Family history of colonic polyps [Z83.71]    Post-Op Diagnosis Codes:     * Colon cancer screening [Z12.11]     * Family history of colonic polyps [Z83.71]         Staff:  Surgeon(s):  Claudette Liu MD         Consent: Procedure colonoscopy indications, risks and procedure were discussed with the patient, including but not limited to, bleeding, infection, possibility of perforation and possible polypectomy. All of the patient's questions were answered, and signed informed consent was obtained and placed on the chart.      Sedation: Sedation was provided by anesthesia.      Estimated Blood Loss: minimal    Specimens:   ID Type Source Tests Collected by Time   A : descending colon polyp Polyp Large Intestine, Left / Descending Colon TISSUE PATHOLOGY EXAM Claudette Liu MD 10/14/2019 1120         Description of Procedure:   After excellent sedation of a digital rectal examination is performed and a flexible colonoscope was inserted into the rectum past to the cecum.  The cecum was identified by both the ileocecal valve and the appendiceal orifice.  The ov good.  Terminal ileum was entered this is normal.  Upon withdrawal scope careful examination mucosa was made.  Pertinent findings include mild diverticulosis noted in the descending and transverse colon.  There is a 3 mm sessile descending polyp removed using forceps.  The scope was then carefully withdrawn to the rectum retroflexed were internal hemorrhoids are noted.  Scope was straightened and withdrawn out the patient with no immediate complications and she tolerated the procedure well.      Withdrawal time: 11 minutes  Quality of bowel preparation: Good    Findings:   Diverticulosis  Internal hemorrhoids  Single polyp  removed using forceps  Weight biopsy results and repeat colonoscopy recommended for 5 years    Complications: None        Claudette Liu MD     Date: 10/14/2019  Time: 11:31 AM

## 2019-10-14 NOTE — ANESTHESIA POSTPROCEDURE EVALUATION
Patient: Maranda Gillis    Procedure Summary     Date:  10/14/19 Room / Location:   [facility] /  [facility]    Anesthesia Start:  1101 Anesthesia Stop:  1130    Procedure:  COLONOSCOPY with polypectomy (N/A ) Diagnosis:       Colon cancer screening      Family history of colonic polyps      (Colon cancer screening [Z12.11])      (Family history of colonic polyps [Z83.71])    Surgeon:  Claudette Liu MD Provider:  Verna Peter MD    Anesthesia Type:  MAC ASA Status:  2          Anesthesia Type: MAC  Last vitals  BP   115/75 (10/14/19 1140)   Temp   98.2 °F (36.8 °C) (10/14/19 1026)   Pulse   61 (10/14/19 1140)   Resp   16 (10/14/19 1140)     SpO2   99 % (10/14/19 1140)     Post Anesthesia Care and Evaluation    Patient location during evaluation: PHASE II  Patient participation: complete - patient participated  Level of consciousness: awake and alert  Pain score: 0  Pain management: adequate  Airway patency: patent  Anesthetic complications: No anesthetic complications  PONV Status: none  Cardiovascular status: acceptable  Respiratory status: acceptable  Hydration status: acceptable

## 2019-10-16 LAB
CYTO UR: NORMAL
LAB AP CASE REPORT: NORMAL
PATH REPORT.FINAL DX SPEC: NORMAL
PATH REPORT.GROSS SPEC: NORMAL

## 2019-10-23 ENCOUNTER — OFFICE VISIT (OUTPATIENT)
Dept: OBSTETRICS AND GYNECOLOGY | Facility: CLINIC | Age: 49
End: 2019-10-23

## 2019-10-23 ENCOUNTER — PROCEDURE VISIT (OUTPATIENT)
Dept: OBSTETRICS AND GYNECOLOGY | Facility: CLINIC | Age: 49
End: 2019-10-23

## 2019-10-23 VITALS
RESPIRATION RATE: 16 BRPM | HEIGHT: 65 IN | HEART RATE: 73 BPM | WEIGHT: 180 LBS | DIASTOLIC BLOOD PRESSURE: 82 MMHG | BODY MASS INDEX: 29.99 KG/M2 | OXYGEN SATURATION: 98 % | SYSTOLIC BLOOD PRESSURE: 120 MMHG

## 2019-10-23 DIAGNOSIS — N83.202 CYST OF LEFT OVARY: ICD-10-CM

## 2019-10-23 DIAGNOSIS — N83.202 CYST OF LEFT OVARY: Primary | ICD-10-CM

## 2019-10-23 DIAGNOSIS — Z13.9 SCREENING FOR CONDITION: Primary | ICD-10-CM

## 2019-10-23 DIAGNOSIS — R23.2 HOT FLASHES: ICD-10-CM

## 2019-10-23 LAB
BILIRUB BLD-MCNC: NEGATIVE MG/DL
CLARITY, POC: CLEAR
COLOR UR: YELLOW
GLUCOSE UR STRIP-MCNC: NEGATIVE MG/DL
KETONES UR QL: NEGATIVE
LEUKOCYTE EST, POC: NEGATIVE
NITRITE UR-MCNC: NEGATIVE MG/ML
PH UR: 5 [PH] (ref 5–8)
PROT UR STRIP-MCNC: NEGATIVE MG/DL
RBC # UR STRIP: NEGATIVE /UL
SP GR UR: 1 (ref 1–1.03)
UROBILINOGEN UR QL: NORMAL

## 2019-10-23 PROCEDURE — 76830 TRANSVAGINAL US NON-OB: CPT | Performed by: OBSTETRICS & GYNECOLOGY

## 2019-10-23 PROCEDURE — 99213 OFFICE O/P EST LOW 20 MIN: CPT | Performed by: OBSTETRICS & GYNECOLOGY

## 2019-10-23 PROCEDURE — 81002 URINALYSIS NONAUTO W/O SCOPE: CPT | Performed by: OBSTETRICS & GYNECOLOGY

## 2019-10-23 NOTE — PROGRESS NOTES
"      Maranda Gillis is a 49 y.o. patient who presents for follow up of   Chief Complaint   Patient presents with   • Follow-up     50 yo est pt here for TVUS and f/u of L ovarian cyst and f/u hot flashes. She has tried Estroven OTC and it is helping \"tremnedously\". She has rare, manageable hot flashes. Her US today shows 10 cm uterus, EL 0.87 cm and L ovarian cyst has resolved compared to her last scan on 8/28/19. She also had her C scope and it was normal.         The following portions of the patient's history were reviewed and updated as appropriate: allergies, current medications and problem list.    Review of Systems   Constitutional: Negative for appetite change, fatigue, fever and unexpected weight change.   Eyes: Negative for photophobia and visual disturbance.   Respiratory: Negative for cough and shortness of breath.    Cardiovascular: Negative for chest pain and palpitations.   Gastrointestinal: Negative for abdominal distention, abdominal pain, constipation, diarrhea and nausea.   Endocrine: Negative for cold intolerance and heat intolerance.   Genitourinary: Negative for dyspareunia, dysuria, menstrual problem, pelvic pain, vaginal bleeding and vaginal discharge.   Musculoskeletal: Negative for back pain.   Skin: Negative for color change and rash.   Neurological: Negative for headaches.   Hematological: Negative for adenopathy. Does not bruise/bleed easily.   Psychiatric/Behavioral: Negative for dysphoric mood and sleep disturbance (night sweats). The patient is not nervous/anxious.        /82 (BP Location: Right arm, Patient Position: Sitting, Cuff Size: Adult)   Pulse 73   Resp 16   Ht 165.1 cm (65\")   Wt 81.6 kg (180 lb)   SpO2 98%   BMI 29.95 kg/m²     Physical Exam   Constitutional: She is oriented to person, place, and time. She appears well-developed and well-nourished.   HENT:   Head: Normocephalic and atraumatic.   Eyes: Conjunctivae are normal. No scleral icterus.   Abdominal: Soft. " Bowel sounds are normal. She exhibits no mass. There is no tenderness. There is no rebound and no guarding. No hernia.   Neurological: She is alert and oriented to person, place, and time.   Psychiatric: She has a normal mood and affect. Her behavior is normal. Judgment and thought content normal.   Nursing note and vitals reviewed.      A/P:  1. L ovarian cyst- resolved on US today.   2. Hot flashes- improved on OTC Estroven.   3. RHM- UTD pap/HPV 8/2019 ( both normal)  4. C scope- UTD 10/2019- repeat 5 years.   5. RTO 8/2020 annual or prn.     Assessment/Plan   Maranda was seen today for follow-up.    Diagnoses and all orders for this visit:    Screening for condition  -     POC Urinalysis Dipstick    Cyst of left ovary    Hot flashes                 No Follow-up on file.      Akila Juares MD    10/23/2019  9:16 PM

## 2019-11-13 ENCOUNTER — HOSPITAL ENCOUNTER (OUTPATIENT)
Dept: MAMMOGRAPHY | Facility: HOSPITAL | Age: 49
Discharge: HOME | End: 2019-11-13
Admitting: OBSTETRICS & GYNECOLOGY

## 2019-11-13 DIAGNOSIS — Z13.9 SCREENING FOR CONDITION: ICD-10-CM

## 2019-11-13 PROCEDURE — 77067 SCR MAMMO BI INCL CAD: CPT

## 2019-11-13 PROCEDURE — 77063 BREAST TOMOSYNTHESIS BI: CPT

## 2020-08-06 DIAGNOSIS — J45.21 MILD INTERMITTENT ASTHMA WITH ACUTE EXACERBATION: ICD-10-CM

## 2020-08-07 RX ORDER — ALBUTEROL SULFATE 90 UG/1
2 AEROSOL, METERED RESPIRATORY (INHALATION) EVERY 4 HOURS PRN
Qty: 8 G | Refills: 6 | OUTPATIENT
Start: 2020-08-07

## 2020-08-14 ENCOUNTER — OFFICE VISIT (OUTPATIENT)
Dept: INTERNAL MEDICINE | Facility: CLINIC | Age: 50
End: 2020-08-14

## 2020-08-14 VITALS
HEIGHT: 65 IN | TEMPERATURE: 97.3 F | BODY MASS INDEX: 30.16 KG/M2 | SYSTOLIC BLOOD PRESSURE: 114 MMHG | WEIGHT: 181 LBS | OXYGEN SATURATION: 98 % | DIASTOLIC BLOOD PRESSURE: 80 MMHG | HEART RATE: 85 BPM

## 2020-08-14 DIAGNOSIS — R22.42 MASS OF LEFT FOOT: Primary | ICD-10-CM

## 2020-08-14 DIAGNOSIS — J45.21 MILD INTERMITTENT ASTHMA WITH ACUTE EXACERBATION: ICD-10-CM

## 2020-08-14 DIAGNOSIS — H93.12 TINNITUS OF LEFT EAR: ICD-10-CM

## 2020-08-14 PROCEDURE — 99213 OFFICE O/P EST LOW 20 MIN: CPT | Performed by: NURSE PRACTITIONER

## 2020-08-14 RX ORDER — ALBUTEROL SULFATE 90 UG/1
2 AEROSOL, METERED RESPIRATORY (INHALATION) EVERY 4 HOURS PRN
Qty: 8 G | Refills: 6 | Status: SHIPPED | OUTPATIENT
Start: 2020-08-14 | End: 2021-09-20 | Stop reason: SDUPTHER

## 2020-08-14 NOTE — PROGRESS NOTES
Subjective   Maranda Gilils is a 50 y.o. female. Patient is here today for   Chief Complaint   Patient presents with   • Mass     Pt c/o painful lump on left foot X march.   .    History of Present Illness   Answers for HPI/ROS submitted by the patient on 8/8/2020   What is the primary reason for your visit?: Other  Please describe your symptoms.: I have a solid lump on the arch of my left foot that is causing pain. , , I would also like to have my left ear checked. I have constant humming sound. It sounds like when you are under water.  Have you had these symptoms before?: Yes  How long have you been having these symptoms?: Greater than 2 weeks  Please list any medications you are currently taking for this condition.: None    New problem to this provider: C/o lump on left foot since March that has become larger and more painful. No injury.     C/o humming in her left ear for several years. Denies any decrease in hearing, although states that her  thinks that she does. No new medications or other changes. She does have allergies and takes flonase and Zyrtec daily.     Patient is also here to follow-up on her asthma.  States that she is doing okay and denies any exacerbations.  She is requesting a refill on her Qvar and albuterol inhalers    The following portions of the patient's history were reviewed and updated as appropriate: allergies, current medications, past family history, past medical history, past social history, past surgical history and problem list.    Review of Systems   Constitutional: Negative.    HENT: Positive for tinnitus.    Respiratory: Negative.    Cardiovascular: Negative.    Musculoskeletal:        Mass on foot       Objective   Vitals:    08/14/20 0841   BP: 114/80   Pulse: 85   Temp: 97.3 °F (36.3 °C)   SpO2: 98%     Body mass index is 30.12 kg/m².  Physical Exam   Constitutional: Vital signs are normal. She appears well-developed and well-nourished.   HENT:   Right Ear: Tympanic  membrane and ear canal normal.   Left Ear: Tympanic membrane and ear canal normal.   Cardiovascular: Normal rate, regular rhythm and normal heart sounds.   Pulmonary/Chest: Effort normal and breath sounds normal.   Musculoskeletal:        Left foot: There is tenderness and swelling.        Skin: Skin is warm, dry and intact.   Psychiatric: She has a normal mood and affect. Her speech is normal and behavior is normal. Thought content normal.   Nursing note and vitals reviewed.      Assessment/Plan   Maranda was seen today for mass.    Diagnoses and all orders for this visit:    Mass of left foot  -     Ambulatory Referral to Podiatry    Mild intermittent asthma with acute exacerbation  -     albuterol sulfate  (90 Base) MCG/ACT inhaler; Inhale 2 puffs Every 4 (Four) Hours As Needed for Wheezing.  -     Beclomethasone Diprop HFA (Qvar RediHaler) 80 MCG/ACT inhaler; Inhale 1 puff 2 (Two) Times a Day.    Tinnitus of left ear  -     Ambulatory Referral to ENT (Otolaryngology)

## 2020-09-16 ENCOUNTER — OFFICE VISIT (OUTPATIENT)
Dept: OBSTETRICS AND GYNECOLOGY | Facility: CLINIC | Age: 50
End: 2020-09-16

## 2020-09-16 VITALS
HEIGHT: 65 IN | BODY MASS INDEX: 30.39 KG/M2 | WEIGHT: 182.4 LBS | SYSTOLIC BLOOD PRESSURE: 118 MMHG | DIASTOLIC BLOOD PRESSURE: 82 MMHG

## 2020-09-16 DIAGNOSIS — Z01.411 ENCOUNTER FOR GYNECOLOGICAL EXAMINATION WITH ABNORMAL FINDING: Primary | ICD-10-CM

## 2020-09-16 DIAGNOSIS — N95.2 VAGINAL ATROPHY: ICD-10-CM

## 2020-09-16 DIAGNOSIS — Z12.39 SCREENING FOR BREAST CANCER: ICD-10-CM

## 2020-09-16 PROCEDURE — 99396 PREV VISIT EST AGE 40-64: CPT | Performed by: OBSTETRICS & GYNECOLOGY

## 2020-09-16 PROCEDURE — 99213 OFFICE O/P EST LOW 20 MIN: CPT | Performed by: OBSTETRICS & GYNECOLOGY

## 2020-09-16 RX ORDER — IBUPROFEN 800 MG/1
800 TABLET ORAL EVERY 8 HOURS PRN
COMMUNITY
Start: 2020-07-09 | End: 2021-10-26

## 2020-09-16 RX ORDER — ESTRADIOL 0.1 MG/G
1 CREAM VAGINAL 2 TIMES WEEKLY
Qty: 45 G | Refills: 6 | Status: SHIPPED | OUTPATIENT
Start: 2020-09-17 | End: 2021-10-26

## 2020-09-16 NOTE — PROGRESS NOTES
GYN Annual Exam     CC- Here for annual exam.     Maranda Gillis is a 50 y.o. female established patient who presents for annual well woman exam and vaginal dryness.  She has never gone greater than 1 year without a cycle.  Her last menstrual cycle was in 2020.  She has been using Estroven and evening primrose oil intermittently.  She has had a month or 2 with severe hot flashes and then the symptoms will resolve for 2 to 3 months.  She did have migraines with birth control pills and so is probably not a great candidate for systemic hormone replacement therapy.  She is having vaginal dryness and is interested in starting vaginal estrogen therapy.  Her mom  in 2019 for melanoma.  Maranda sees dermatology yearly.      OB History        2    Para   1    Term   1            AB   1    Living   1       SAB   1    TAB        Ectopic        Molar        Multiple        Live Births              Obstetric Comments   1   1 SAB no D&C             Menarche:12  Menopause:not yet  HRT:none  Current contraception: none  History of abnormal Pap smear: no   History of abnormal mammogram: no  Family history of uterine, colon or ovarian cancer: no  Family history of breast cancer: no  STD's: none   Last pap smear: 2019- normal pap/HPV  Gardasil: none  PAUL: none  Migraines with OCPs    Health Maintenance   Topic Date Due   • ANNUAL PHYSICAL  1973   • BH AMB Pneumococcal Vaccine 0-64 (1 of 1 - PPSV23) 1974   • PNEUMOCOCCAL VACCINE (19-64 MEDIUM RISK) (1 of 1 - PPSV23) 1987   • TDAP/TD VACCINES (1 - Tdap) 1987   • HEPATITIS C SCREENING  09/15/2017   • ZOSTER VACCINE (1 of 2) 2018   • INFLUENZA VACCINE  2020   • Annual Gynecologic Pelvic and Breast Exam  2020   • MAMMOGRAM  2021   • PAP SMEAR  2022   • COLONOSCOPY  10/14/2024   • BH AMB Pneumococcal Vaccine 65+ (1 of 1 - PPSV23) 2033       Past Medical History:   Diagnosis Date   • Allergic    •  "Colon polyp    • Migraine     with OCPS   • Ovarian cyst    • Vitamin D deficiency        Past Surgical History:   Procedure Laterality Date   • BUNIONECTOMY      bilateral   • COLONOSCOPY N/A 10/14/2019    Procedure: COLONOSCOPY with polypectomy;  Surgeon: Claudette Liu MD;  Location: [facility];  Service: Gastroenterology   • SKIN LESION EXCISION           Current Outpatient Medications:   •  albuterol sulfate  (90 Base) MCG/ACT inhaler, Inhale 2 puffs Every 4 (Four) Hours As Needed for Wheezing., Disp: 8 g, Rfl: 6  •  Beclomethasone Diprop HFA (Qvar RediHaler) 80 MCG/ACT inhaler, Inhale 1 puff 2 (Two) Times a Day., Disp: 10.6 g, Rfl: 2  •  cetirizine (zyrTEC) 10 MG tablet, Take 10 mg by mouth Daily., Disp: , Rfl:   •  [START ON 9/17/2020] estradiol (ESTRACE) 0.1 MG/GM vaginal cream, Insert 1 g into the vagina 2 (Two) Times a Week., Disp: 45 g, Rfl: 6  •  EVENING PRIMROSE OIL PO, Take  by mouth., Disp: , Rfl:   •  fluticasone (FLONASE) 50 MCG/ACT nasal spray, 1 spray into each nostril Daily., Disp: , Rfl:   •  ibuprofen (ADVIL,MOTRIN) 800 MG tablet, Take 800 mg by mouth Every 8 (Eight) Hours As Needed. for pain, Disp: , Rfl:   •  Rhubarb (ESTROVEN MENOPAUSE RELIEF) 4 MG tablet, Take 4 mg by mouth., Disp: , Rfl:     Allergies   Allergen Reactions   • Guaifenesin-Codeine Other (See Comments)     \"I cant take that because it causes severe coughing fits and insomnia\"   • Other      ALLERGIC TO WEEDS, MOLDS, DUST MITES, GRASS, TREES, CATS   • Singulair [Montelukast Sodium] Hallucinations       Social History     Tobacco Use   • Smoking status: Never Smoker   • Smokeless tobacco: Never Used   Substance Use Topics   • Alcohol use: Yes     Comment: SOCIALLY   • Drug use: No       Family History   Problem Relation Age of Onset   • Diabetes Mother    • Melanoma Mother    • COPD Father    • Emphysema Father    • Asthma Sister    • Breast cancer Neg Hx    • Ovarian cancer Neg Hx    • Colon cancer Neg Hx    • Deep vein " "thrombosis Neg Hx    • Pulmonary embolism Neg Hx        Review of Systems   Constitutional: Positive for activity change and unexpected weight change. Negative for appetite change, fatigue and fever.   Eyes: Negative for photophobia and visual disturbance.   Respiratory: Negative for cough and shortness of breath.    Cardiovascular: Negative for chest pain and palpitations.   Gastrointestinal: Negative for abdominal distention, abdominal pain, constipation, diarrhea and nausea.   Endocrine: Positive for heat intolerance. Negative for cold intolerance.   Genitourinary: Positive for vaginal pain (dryness). Negative for dyspareunia, dysuria, menstrual problem, pelvic pain, vaginal bleeding and vaginal discharge.   Musculoskeletal: Negative for back pain.   Skin: Negative for color change and rash.   Neurological: Negative for headaches.   Hematological: Negative for adenopathy. Does not bruise/bleed easily.   Psychiatric/Behavioral: Negative for dysphoric mood. The patient is not nervous/anxious.        /82   Ht 165.1 cm (65\")   Wt 82.7 kg (182 lb 6.4 oz)   BMI 30.35 kg/m²     Physical Exam   Constitutional: She is oriented to person, place, and time. She appears well-developed.   HENT:   Head: Normocephalic and atraumatic.   Eyes: Conjunctivae are normal. No scleral icterus.   Neck: Neck supple. No thyromegaly present.   Cardiovascular: Normal rate and regular rhythm.   Pulmonary/Chest: Effort normal and breath sounds normal. Right breast exhibits no inverted nipple, no mass, no nipple discharge, no skin change and no tenderness. Left breast exhibits skin change. Left breast exhibits no inverted nipple, no mass, no nipple discharge and no tenderness.       Abdominal: Soft. Bowel sounds are normal. She exhibits no distension and no mass. There is no abdominal tenderness. There is no rebound and no guarding. No hernia.   Genitourinary:    Pelvic exam was performed with patient supine.   There is no rash, " tenderness or lesion on the right labia. There is no rash, tenderness or lesion on the left labia. Uterus is not deviated, not enlarged, not fixed and not tender. Cervix exhibits no motion tenderness, no discharge and no friability. Right adnexum displays no mass, no tenderness and no fullness. Left adnexum displays no mass, no tenderness and no fullness.    No vaginal discharge, erythema, tenderness or bleeding.   No erythema, tenderness or bleeding in the vagina.    No foreign body in the vagina.      No signs of injury in the vagina.      Genitourinary Comments: Moderate atrophy noted     Neurological: She is alert and oriented to person, place, and time.   Skin: Skin is warm and dry.   Psychiatric: Her behavior is normal. Judgment and thought content normal.   Nursing note and vitals reviewed.         Assessment/Plan    1) GYN HM: normal pap/HPV 8/2019.   SBE demonstrated and encouraged.  2) STD screening: declines Condoms encouraged.  3) Bone health - Weight bearing exercise, dietary calcium recommendations and vitamin D reviewed.   4) Diet and Exercise discussed  5) Smoking Status: No  6) vaginal atrophy- Patient counseled that vaginal estrogen rings, creams and tablets are available and highly effective at treating local vaginal symptoms such as atrophy and vaginal dryness.  Vaginal estrogen does not cause uterine overgrowth and does not require a progestogen to protect the uterus.  Very small amounts of estrogen are absorbed systemically.  For patients with a history of an estrogen dependent cancer such as breast cancer, the decision to use local estrogen for local vaginal symptoms should be made after consultation with her oncologist.  Possible side effects include local irritation or burning and/or vaginal bleeding and should always be reported.  Rx Estrace cream x 2/week.   7)MMG: UTD 11/2019, B1, repeat MMG 11/2020  8) DEXA-plan age 53  9)C scope-UTD 10/2019, repeat 5 years  10)Parts of this document  have been copied or forwarded from her previous visits and have been reviewed, updated and edited as indicated.   11)I saw the patient with a face mask, gloves and eye protection  The patient herself was masked.  Social distancing was observed as appropriate.  12)Follow up prn and 1 year       Maranda was seen today for gynecologic exam.    Diagnoses and all orders for this visit:    Encounter for gynecological examination with abnormal finding    Vaginal atrophy    Screening for breast cancer  -     Mammo Screening Bilateral With CAD; Future    Other orders  -     estradiol (ESTRACE) 0.1 MG/GM vaginal cream; Insert 1 g into the vagina 2 (Two) Times a Week.        Akila Juares MD   9/16/2020    10:45 EDT

## 2021-03-30 ENCOUNTER — BULK ORDERING (OUTPATIENT)
Dept: CASE MANAGEMENT | Facility: OTHER | Age: 51
End: 2021-03-30

## 2021-03-30 DIAGNOSIS — Z23 IMMUNIZATION DUE: ICD-10-CM

## 2021-08-30 ENCOUNTER — TELEMEDICINE (OUTPATIENT)
Dept: FAMILY MEDICINE CLINIC | Facility: TELEHEALTH | Age: 51
End: 2021-08-30

## 2021-08-30 DIAGNOSIS — R05.9 COUGH: Primary | ICD-10-CM

## 2021-08-30 PROCEDURE — 99212 OFFICE O/P EST SF 10 MIN: CPT | Performed by: NURSE PRACTITIONER

## 2021-08-30 RX ORDER — METHYLPREDNISOLONE 4 MG/1
TABLET ORAL
Qty: 21 TABLET | Refills: 0 | Status: SHIPPED | OUTPATIENT
Start: 2021-08-30 | End: 2021-09-20

## 2021-08-30 RX ORDER — DOXYCYCLINE 100 MG/1
100 CAPSULE ORAL 2 TIMES DAILY
Qty: 20 CAPSULE | Refills: 0 | Status: SHIPPED | OUTPATIENT
Start: 2021-08-30 | End: 2021-09-09

## 2021-08-30 NOTE — PATIENT INSTRUCTIONS
Cough, Adult  Coughing is a reflex that clears your throat and your airways (respiratory system). Coughing helps to heal and protect your lungs. It is normal to cough occasionally, but a cough that happens with other symptoms or lasts a long time may be a sign of a condition that needs treatment. An acute cough may only last 2-3 weeks, while a chronic cough may last 8 or more weeks.  Coughing is commonly caused by:  · Infection of the respiratory systemby viruses or bacteria.  · Breathing in substances that irritate your lungs.  · Allergies.  · Asthma.  · Mucus that runs down the back of your throat (postnasal drip).  · Smoking.  · Acid backing up from the stomach into the esophagus (gastroesophageal reflux).  · Certain medicines.  · Chronic lung problems.  · Other medical conditions such as heart failure or a blood clot in the lung (pulmonary embolism).  Follow these instructions at home:  Medicines  · Take over-the-counter and prescription medicines only as told by your health care provider.  · Talk with your health care provider before you take a cough suppressant medicine.  Lifestyle    · Avoid cigarette smoke. Do not use any products that contain nicotine or tobacco, such as cigarettes, e-cigarettes, and chewing tobacco. If you need help quitting, ask your health care provider.  · Drink enough fluid to keep your urine pale yellow.  · Avoid caffeine.  · Do not drink alcohol if your health care provider tells you not to drink.  General instructions    · Pay close attention to changes in your cough. Tell your health care provider about them.  · Always cover your mouth when you cough.  · Avoid things that make you cough, such as perfume, candles, cleaning products, or campfire or tobacco smoke.  · If the air is dry, use a cool mist vaporizer or humidifier in your bedroom or your home to help loosen secretions.  · If your cough is worse at night, try to sleep in a semi-upright position.  · Rest as needed.  · Keep  all follow-up visits as told by your health care provider. This is important.  Contact a health care provider if you:  · Have new symptoms.  · Cough up pus.  · Have a cough that does not get better after 2-3 weeks or gets worse.  · Cannot control your cough with cough suppressant medicines and you are losing sleep.  · Have pain that gets worse or pain that is not helped with medicine.  · Have a fever.  · Have unexplained weight loss.  · Have night sweats.  Get help right away if:  · You cough up blood.  · You have difficulty breathing.  · Your heartbeat is very fast.  These symptoms may represent a serious problem that is an emergency. Do not wait to see if the symptoms will go away. Get medical help right away. Call your local emergency services (911 in the [place] Do not drive yourself to the hospital.  Summary  · Coughing is a reflex that clears your throat and your airways. It is normal to cough occasionally, but a cough that happens with other symptoms or lasts a long time may be a sign of a condition that needs treatment.  · Take over-the-counter and prescription medicines only as told by your health care provider.  · Always cover your mouth when you cough.  · Contact a health care provider if you have new symptoms or a cough that does not get better after 2-3 weeks or gets worse.  This information is not intended to replace advice given to you by your health care provider. Make sure you discuss any questions you have with your health care provider.  Document Revised: 01/06/2020 Document Reviewed: 01/06/2020  [employer] Patient Education © 2021 [employer]    Acute Bronchitis, Adult    Acute bronchitis is sudden or acute swelling of the air tubes (bronchi) in the lungs. Acute bronchitis causes these tubes to fill with mucus, which can make it hard to breathe. It can also cause coughing or wheezing.  In adults, acute bronchitis usually goes away within 2 weeks. A cough caused by bronchitis may last up to 3  weeks. Smoking, allergies, and asthma can make the condition worse.  What are the causes?  This condition can be caused by germs and by substances that irritate the lungs, including:  · Cold and flu viruses. The most common cause of this condition is the virus that causes the common cold.  · Bacteria.  · Substances that irritate the lungs, including:  ? Smoke from cigarettes and other forms of tobacco.  ? Dust and pollen.  ? Fumes from chemical products, gases, or burned fuel.  ? Other materials that pollute indoor or outdoor air.  · Close contact with someone who has acute bronchitis.  What increases the risk?  The following factors may make you more likely to develop this condition:  · A weak body's defense system, also called the immune system.  · A condition that affects your lungs and breathing, such as asthma.  What are the signs or symptoms?  Common symptoms of this condition include:  · Lung and breathing problems, such as:  ? Coughing. This may bring up clear, yellow, or green mucus from your lungs (sputum).  ? Wheezing.  ? Having too much mucus in your lungs (chest congestion).  ? Having shortness of breath.  · A fever.  · Chills.  · Aches and pains, including:  ? Tightness in your chest and other body aches.  ? A sore throat.  How is this diagnosed?  This condition is usually diagnosed based on:  · Your symptoms and medical history.  · A physical exam.  You may also have other tests, including tests to rule out other conditions, such pneumonia. These tests include:  · A test of lung function.  · Test of a mucus sample to look for the presence of bacteria.  · Tests to check the oxygen level in your blood.  · Blood tests.  · Chest X-ray.  How is this treated?  Most cases of acute bronchitis clear up over time without treatment. Your health care provider may recommend:  · Drinking more fluids. This can thin your mucus, which may improve your breathing.  · Taking a medicine for a fever or cough.  · Using a  device that gets medicine into your lungs (inhaler) to help improve breathing and control coughing.  · Using a vaporizer or a humidifier. These are machines that add water to the air to help you breathe better.  Follow these instructions at home:  Activity  · Get plenty of rest.  · Return to your normal activities as told by your health care provider. Ask your health care provider what activities are safe for you.  Lifestyle  · Drink enough fluid to keep your urine pale yellow.  · Do not drink alcohol.  · Do not use any products that contain nicotine or tobacco, such as cigarettes, e-cigarettes, and chewing tobacco. If you need help quitting, ask your health care provider. Be aware that:  ? Your bronchitis will get worse if you smoke or breathe in other people's smoke (secondhand smoke).  ? Your lungs will heal faster if you quit smoking.  General instructions    · Take over-the-counter and prescription medicines only as told by your health care provider.  · Use an inhaler, vaporizer, or humidifier as told by your health care provider.  · If you have a sore throat, gargle with a salt-water mixture 3-4 times a day or as needed. To make a salt-water mixture, completely dissolve ½-1 tsp (3-6 g) of salt in 1 cup (237 mL) of warm water.  · Keep all follow-up visits as told by your health care provider. This is important.  How is this prevented?  To lower your risk of getting this condition again:  · Wash your hands often with soap and water. If soap and water are not available, use hand .  · Avoid contact with people who have cold symptoms.  · Try not to touch your mouth, nose, or eyes with your hands.  · Avoid places where there are fumes from chemicals. Breathing these fumes will make your condition worse.  · Get the flu shot every year.  Contact a health care provider if:  · Your symptoms do not improve after 2 weeks of treatment.  · You vomit more than once or twice.  · You have symptoms of dehydration such  as:  ? Dark urine.  ? Dry skin or eyes.  ? Increased thirst.  ? Headaches.  ? Confusion.  ? Muscle cramps.  Get help right away if you:  · Cough up blood.  · Feel pain in your chest.  · Have severe shortness of breath.  · Faint or keep feeling like you are going to faint.  · Have a severe headache.  · Have fever or chills that get worse.  These symptoms may represent a serious problem that is an emergency. Do not wait to see if the symptoms will go away. Get medical help right away. Call your local emergency services (911 in the [place] Do not drive yourself to the hospital.  Summary  · Acute bronchitis is sudden (acute) inflammation of the air tubes (bronchi) between the windpipe and the lungs. In adults, acute bronchitis usually goes away within 2 weeks, although coughing may last 3 weeks or longer  · Take over-the-counter and prescription medicines only as told by your health care provider.  · Drink enough fluid to keep your urine pale yellow.  · Contact a health care provider if your symptoms do not improve after 2 weeks of treatment.  · Get help right away if you cough up blood, faint, or have chest pain or shortness of breath.  This information is not intended to replace advice given to you by your health care provider. Make sure you discuss any questions you have with your health care provider.  Document Revised: 08/31/2020 Document Reviewed: 07/10/2020  [employer] Patient Education © 2021 [employer]

## 2021-08-30 NOTE — PROGRESS NOTES
CHIEF COMPLAINT  Chief Complaint   Patient presents with   • Cough         HPI  Maranda Gillis is a 51 y.o. female  presents with complaint of few days history of sinus congestion with clear discharge, ear congestion, PND, dry cough, chest congestion, shortness of breath.  Denies fever, night sweats.    Has been using Albuterol inhaler; taking zyrtec and benadryl; is fully vaccinated for COVID-19    Did at-home COVID test which was negative    Review of Systems   Constitutional: Negative for activity change, appetite change, fatigue and fever.   HENT: Positive for congestion, postnasal drip, sinus pressure, sinus pain and sore throat. Negative for ear pain.    Respiratory: Positive for cough and chest tightness. Negative for shortness of breath and wheezing.    Neurological: Negative for dizziness and headaches.   All other systems reviewed and are negative.      Past Medical History:   Diagnosis Date   • Allergic    • Colon polyp    • Migraine     with OCPS   • Ovarian cyst    • Vitamin D deficiency        Family History   Problem Relation Age of Onset   • Diabetes Mother    • Melanoma Mother    • COPD Father    • Emphysema Father    • Asthma Sister    • Breast cancer Neg Hx    • Ovarian cancer Neg Hx    • Colon cancer Neg Hx    • Deep vein thrombosis Neg Hx    • Pulmonary embolism Neg Hx        Social History     Socioeconomic History   • Marital status:      Spouse name: Not on file   • Number of children: Not on file   • Years of education: Not on file   • Highest education level: Not on file   Tobacco Use   • Smoking status: Never Smoker   • Smokeless tobacco: Never Used   Substance and Sexual Activity   • Alcohol use: Yes     Comment: SOCIALLY   • Drug use: No   • Sexual activity: Yes     Partners: Male     Birth control/protection: None         There were no vitals taken for this visit.    PHYSICAL EXAM  Physical Exam   Constitutional: She is oriented to person, place, and time. She appears well-developed  and well-nourished. She does not have a sickly appearance. She does not appear ill.   HENT:   Head: Normocephalic and atraumatic.   Pulmonary/Chest: Effort normal.  No respiratory distress.  Neurological: She is alert and oriented to person, place, and time.         Diagnoses and all orders for this visit:    1. Cough (Primary)  -     doxycycline (MONODOX) 100 MG capsule; Take 1 capsule by mouth 2 (Two) Times a Day for 10 days.  Dispense: 20 capsule; Refill: 0  -     methylPREDNISolone (MEDROL) 4 MG dose pack; Take as directed on package instructions.  Dispense: 21 tablet; Refill: 0    --complete Doxycycline and Medrol as prescribed  --increase fluid intake, continue zyrtec/benadryl/flonase  --if no improvement in 5-7 days, will need follow-up for further evaluation       FOLLOW-UP  As discussed during visit with PCP/[facility] if no improvement or Urgent Care/Emergency Department if worsening of symptoms    Patient verbalizes understanding of medication dosage, comfort measures, instructions for treatment and follow-up.    ARIE Pastor  08/30/2021  08:39 EDT    This visit was performed via Telehealth.  This patient has been instructed to follow-up with their primary care provider if their symptoms worsen or the treatment provided does not resolve their illness.

## 2021-09-20 ENCOUNTER — OFFICE VISIT (OUTPATIENT)
Dept: INTERNAL MEDICINE | Facility: CLINIC | Age: 51
End: 2021-09-20

## 2021-09-20 VITALS
WEIGHT: 170.7 LBS | OXYGEN SATURATION: 98 % | TEMPERATURE: 97.1 F | BODY MASS INDEX: 28.44 KG/M2 | HEART RATE: 94 BPM | DIASTOLIC BLOOD PRESSURE: 74 MMHG | SYSTOLIC BLOOD PRESSURE: 110 MMHG | HEIGHT: 65 IN

## 2021-09-20 DIAGNOSIS — R05.9 COUGH: Primary | ICD-10-CM

## 2021-09-20 DIAGNOSIS — J45.21 MILD INTERMITTENT ASTHMA WITH ACUTE EXACERBATION: ICD-10-CM

## 2021-09-20 PROCEDURE — 99213 OFFICE O/P EST LOW 20 MIN: CPT | Performed by: NURSE PRACTITIONER

## 2021-09-20 RX ORDER — BECLOMETHASONE DIPROPIONATE HFA 80 UG/1
1 AEROSOL, METERED RESPIRATORY (INHALATION)
Qty: 10.6 G | Refills: 2 | Status: SHIPPED | OUTPATIENT
Start: 2021-09-20 | End: 2021-09-20 | Stop reason: SDUPTHER

## 2021-09-20 RX ORDER — ALBUTEROL SULFATE 90 UG/1
2 AEROSOL, METERED RESPIRATORY (INHALATION) EVERY 4 HOURS PRN
Qty: 8 G | Refills: 6 | Status: SHIPPED | OUTPATIENT
Start: 2021-09-20 | End: 2022-06-10 | Stop reason: SDUPTHER

## 2021-09-20 RX ORDER — PROMETHAZINE HYDROCHLORIDE AND CODEINE PHOSPHATE 6.25; 1 MG/5ML; MG/5ML
5 SOLUTION ORAL EVERY 12 HOURS PRN
Qty: 120 ML | Refills: 0 | Status: SHIPPED | OUTPATIENT
Start: 2021-09-20 | End: 2021-10-26

## 2021-09-20 RX ORDER — BENZONATATE 200 MG/1
200 CAPSULE ORAL 3 TIMES DAILY PRN
Qty: 30 CAPSULE | Refills: 0 | Status: SHIPPED | OUTPATIENT
Start: 2021-09-20 | End: 2021-10-26

## 2021-09-20 RX ORDER — BECLOMETHASONE DIPROPIONATE HFA 80 UG/1
1 AEROSOL, METERED RESPIRATORY (INHALATION)
Qty: 10.6 G | Refills: 6 | Status: SHIPPED | OUTPATIENT
Start: 2021-09-20 | End: 2021-10-26 | Stop reason: ALTCHOICE

## 2021-09-20 NOTE — PROGRESS NOTES
Subjective   Maranda Gillis is a 51 y.o. female. Patient is here today for   Chief Complaint   Patient presents with   • Cough     PT C/O COUGH, CONGESTION, SINUS PRESSURE, HA, SORE THROAT X FRIDAY.   • Sore Throat   • Headache   .    History of Present Illness   C/o cough x 3 days associated with some nasal congestion, headache, sore throat. States that her cough seems to be getting worse. No fever, no sick contacts, no loss of taste or smell. No wheezing or shortness of breath.  Flu vaccine last week. She had Covid vaccine in March and April  She has been out of her Qvar for awhile. Albuterol has helped    The following portions of the patient's history were reviewed and updated as appropriate: allergies, current medications, past family history, past medical history, past social history, past surgical history and problem list.    Review of Systems    Objective   Vitals:    09/20/21 1030   BP: 110/74   Pulse: 94   Temp: 97.1 °F (36.2 °C)   SpO2: 98%     Body mass index is 28.41 kg/m².  Physical Exam  Vitals and nursing note reviewed.   Constitutional:       Appearance: She is well-developed.   HENT:      Right Ear: Ear canal normal. A middle ear effusion is present.      Left Ear: Ear canal normal. A middle ear effusion is present.      Mouth/Throat:      Pharynx: Posterior oropharyngeal erythema present.   Cardiovascular:      Rate and Rhythm: Normal rate and regular rhythm.      Heart sounds: Normal heart sounds.   Pulmonary:      Effort: Pulmonary effort is normal.      Breath sounds: Normal breath sounds.   Skin:     General: Skin is warm and dry.   Psychiatric:         Speech: Speech normal.         Behavior: Behavior normal.         Thought Content: Thought content normal.         Assessment/Plan   Diagnoses and all orders for this visit:    1. Cough (Primary)  -     benzonatate (TESSALON) 200 MG capsule; Take 1 capsule by mouth 3 (Three) Times a Day As Needed for Cough.  Dispense: 30 capsule; Refill: 0  -      promethazine-codeine (PHENERGAN with CODEINE) 6.25-10 MG/5ML solution; Take 5 mL by mouth Every 12 (Twelve) Hours As Needed for Cough.  Dispense: 120 mL; Refill: 0    2. Mild intermittent asthma with acute exacerbation  -     Discontinue: Beclomethasone Diprop HFA (Qvar RediHaler) 80 MCG/ACT inhaler; Inhale 1 puff 2 (Two) Times a Day.  Dispense: 10.6 g; Refill: 2  -     albuterol sulfate  (90 Base) MCG/ACT inhaler; Inhale 2 puffs Every 4 (Four) Hours As Needed for Wheezing.  Dispense: 8 g; Refill: 6  -     COVID-19,[facility] ROUTINE, NP/OP SWAB IN TRANSPORT MEDIA OR ESWAB 72 HR TAT - Swab, Anterior nasal  -     Beclomethasone Diprop HFA (Qvar RediHaler) 80 MCG/ACT inhaler; Inhale 1 puff 2 (Two) Times a Day.  Dispense: 10.6 g; Refill: 6      Will call patient with Covid results. She will quarantine until those results are received

## 2021-09-21 LAB
LABCORP SARS-COV-2, NAA 2 DAY TAT: NORMAL
SARS-COV-2 RNA RESP QL NAA+PROBE: NOT DETECTED

## 2021-09-22 DIAGNOSIS — J01.90 ACUTE NON-RECURRENT SINUSITIS, UNSPECIFIED LOCATION: Primary | ICD-10-CM

## 2021-09-22 RX ORDER — AMOXICILLIN 875 MG/1
875 TABLET, COATED ORAL 2 TIMES DAILY
Qty: 14 TABLET | Refills: 0 | Status: SHIPPED | OUTPATIENT
Start: 2021-09-22 | End: 2021-09-29

## 2021-09-24 ENCOUNTER — TELEPHONE (OUTPATIENT)
Dept: INTERNAL MEDICINE | Facility: CLINIC | Age: 51
End: 2021-09-24

## 2021-09-24 DIAGNOSIS — J45.21 MILD INTERMITTENT ASTHMA WITH ACUTE EXACERBATION: ICD-10-CM

## 2021-09-24 DIAGNOSIS — J01.90 ACUTE NON-RECURRENT SINUSITIS, UNSPECIFIED LOCATION: Primary | ICD-10-CM

## 2021-09-24 RX ORDER — PREDNISONE 20 MG/1
20 TABLET ORAL 2 TIMES DAILY
Qty: 10 TABLET | Refills: 0 | Status: SHIPPED | OUTPATIENT
Start: 2021-09-24 | End: 2021-10-26

## 2021-09-24 NOTE — TELEPHONE ENCOUNTER
----- Message from Essie Damon sent at 9/24/2021  7:06 AM EDT -----  Regarding: FW: Visit Follow-Up Question  Contact: [phone]  She asked antibiotics on Wednesday and did not want to wait for you to advise on yesterday so Fariba sent antibiotics on Wednesday. Here she is again.  ----- Message -----  From: Maranda Gillis  Sent: 9/23/2021   7:25 PM EDT  To: [facility] [place] [address] [facility] [place]  Subject: Visit Follow-Up Question                         Ani,     I am not getting better. I'm at my wits end. I'm coughing so often and so severely that my head throbs all day and night. I'm using Sudafed, ibuprofen, the benzonatate, promethazine with codeine at night and drinking tons of fluids.     Other symptoms include sneezing and head congestion. I feel like I need something to dry me out, as the drainage is continuing and irritating my throat and causing these severe guttural coughs. I did a steroid a few weeks ago that seemed to help. Can I try that again?

## 2021-10-20 DIAGNOSIS — E66.3 OVERWEIGHT (BMI 25.0-29.9): ICD-10-CM

## 2021-10-20 DIAGNOSIS — Z83.71 FAMILY HISTORY OF COLONIC POLYPS: Primary | ICD-10-CM

## 2021-10-20 DIAGNOSIS — E55.9 VITAMIN D DEFICIENCY: ICD-10-CM

## 2021-10-20 DIAGNOSIS — J45.21 MILD INTERMITTENT ASTHMA WITH ACUTE EXACERBATION: ICD-10-CM

## 2021-10-20 DIAGNOSIS — Z00.00 HEALTHCARE MAINTENANCE: ICD-10-CM

## 2021-10-22 LAB
25(OH)D3+25(OH)D2 SERPL-MCNC: 16.9 NG/ML (ref 30–100)
ALBUMIN SERPL-MCNC: 4.2 G/DL (ref 3.8–4.9)
ALBUMIN/GLOB SERPL: 1.6 {RATIO} (ref 1.2–2.2)
ALP SERPL-CCNC: 103 IU/L (ref 44–121)
ALT SERPL-CCNC: 23 IU/L (ref 0–32)
AST SERPL-CCNC: 21 IU/L (ref 0–40)
BASOPHILS # BLD AUTO: 0 X10E3/UL (ref 0–0.2)
BASOPHILS NFR BLD AUTO: 1 %
BILIRUB SERPL-MCNC: 0.4 MG/DL (ref 0–1.2)
BUN SERPL-MCNC: 17 MG/DL (ref 6–24)
BUN/CREAT SERPL: 16 (ref 9–23)
CALCIUM SERPL-MCNC: 9.3 MG/DL (ref 8.7–10.2)
CHLORIDE SERPL-SCNC: 105 MMOL/L (ref 96–106)
CHOLEST SERPL-MCNC: 205 MG/DL (ref 100–199)
CO2 SERPL-SCNC: 23 MMOL/L (ref 20–29)
CREAT SERPL-MCNC: 1.07 MG/DL (ref 0.57–1)
EOSINOPHIL # BLD AUTO: 0.1 X10E3/UL (ref 0–0.4)
EOSINOPHIL NFR BLD AUTO: 3 %
ERYTHROCYTE [DISTWIDTH] IN BLOOD BY AUTOMATED COUNT: 13.2 % (ref 11.7–15.4)
GLOBULIN SER CALC-MCNC: 2.6 G/DL (ref 1.5–4.5)
GLUCOSE SERPL-MCNC: 98 MG/DL (ref 65–99)
HCT VFR BLD AUTO: 40.4 % (ref 34–46.6)
HCV AB S/CO SERPL IA: <0.1 S/CO RATIO (ref 0–0.9)
HDLC SERPL-MCNC: 94 MG/DL
HGB BLD-MCNC: 13.2 G/DL (ref 11.1–15.9)
IMM GRANULOCYTES # BLD AUTO: 0 X10E3/UL (ref 0–0.1)
IMM GRANULOCYTES NFR BLD AUTO: 0 %
LDLC SERPL CALC-MCNC: 101 MG/DL (ref 0–99)
LDLC/HDLC SERPL: 1.1 RATIO (ref 0–3.2)
LYMPHOCYTES # BLD AUTO: 1.6 X10E3/UL (ref 0.7–3.1)
LYMPHOCYTES NFR BLD AUTO: 44 %
MCH RBC QN AUTO: 30.1 PG (ref 26.6–33)
MCHC RBC AUTO-ENTMCNC: 32.7 G/DL (ref 31.5–35.7)
MCV RBC AUTO: 92 FL (ref 79–97)
MONOCYTES # BLD AUTO: 0.4 X10E3/UL (ref 0.1–0.9)
MONOCYTES NFR BLD AUTO: 11 %
NEUTROPHILS # BLD AUTO: 1.5 X10E3/UL (ref 1.4–7)
NEUTROPHILS NFR BLD AUTO: 41 %
PLATELET # BLD AUTO: 325 X10E3/UL (ref 150–450)
POTASSIUM SERPL-SCNC: 4.6 MMOL/L (ref 3.5–5.2)
PROT SERPL-MCNC: 6.8 G/DL (ref 6–8.5)
RBC # BLD AUTO: 4.39 X10E6/UL (ref 3.77–5.28)
SODIUM SERPL-SCNC: 141 MMOL/L (ref 134–144)
TRIGL SERPL-MCNC: 52 MG/DL (ref 0–149)
TSH SERPL DL<=0.005 MIU/L-ACNC: 1.56 UIU/ML (ref 0.45–4.5)
VLDLC SERPL CALC-MCNC: 10 MG/DL (ref 5–40)
WBC # BLD AUTO: 3.6 X10E3/UL (ref 3.4–10.8)

## 2021-10-26 ENCOUNTER — OFFICE VISIT (OUTPATIENT)
Dept: INTERNAL MEDICINE | Facility: CLINIC | Age: 51
End: 2021-10-26

## 2021-10-26 VITALS
HEART RATE: 82 BPM | WEIGHT: 176 LBS | DIASTOLIC BLOOD PRESSURE: 62 MMHG | SYSTOLIC BLOOD PRESSURE: 102 MMHG | BODY MASS INDEX: 29.32 KG/M2 | HEIGHT: 65 IN | TEMPERATURE: 97.1 F | OXYGEN SATURATION: 97 %

## 2021-10-26 DIAGNOSIS — N28.9 RENAL INSUFFICIENCY: ICD-10-CM

## 2021-10-26 DIAGNOSIS — E55.9 VITAMIN D DEFICIENCY: ICD-10-CM

## 2021-10-26 DIAGNOSIS — Z00.00 HEALTHCARE MAINTENANCE: Primary | ICD-10-CM

## 2021-10-26 PROCEDURE — 99396 PREV VISIT EST AGE 40-64: CPT | Performed by: NURSE PRACTITIONER

## 2021-10-26 RX ORDER — BUDESONIDE AND FORMOTEROL FUMARATE DIHYDRATE 160; 4.5 UG/1; UG/1
AEROSOL RESPIRATORY (INHALATION)
COMMUNITY
Start: 2021-10-05 | End: 2023-03-30

## 2021-10-26 RX ORDER — LEVOCETIRIZINE DIHYDROCHLORIDE 5 MG/1
TABLET, FILM COATED ORAL
COMMUNITY
Start: 2021-09-30 | End: 2021-10-26 | Stop reason: ALTCHOICE

## 2021-10-26 RX ORDER — MOMETASONE FUROATE 50 UG/1
SPRAY, METERED NASAL
COMMUNITY
Start: 2021-10-01

## 2021-10-26 NOTE — PROGRESS NOTES
"Subjective   Maranda Gillis is a 51 y.o. female and is here for a comprehensive physical exam. The patient reports no problems.    Do you take any herbs or supplements that were not prescribed by a doctor? no    Patient is continuing with the cough, but she has seen an allergist and will be starting allergy shots soon.  He did change her medications also.     History:  LMP: No LMP recorded. (Menstrual status: Other).  Sees gynecology    The following portions of the patient's history were reviewed and updated as appropriate: allergies, current medications, past family history, past medical history, past social history, past surgical history and problem list.    Review of Systems  Do you have pain that bothers you in your daily life? no  A comprehensive review of systems was negative.    Objective   /62 (BP Location: Left arm, Patient Position: Sitting, Cuff Size: Large Adult)   Pulse 82   Temp 97.1 °F (36.2 °C)   Ht 165.1 cm (65\")   Wt 79.8 kg (176 lb)   SpO2 97%   BMI 29.29 kg/m²     General Appearance:    Alert, cooperative, no distress, appears stated age   Head:    Normocephalic, without obvious abnormality, atraumatic   Eyes:    PERRL, conjunctiva/corneas clear, EOM's intact, both eyes   Ears:    Normal TM's and external ear canals, both ears   Nose:   Nares normal, septum midline, mucosa normal, no drainage    or sinus tenderness   Throat:   Lips, mucosa, and tongue normal; teeth and gums normal   Neck:   Supple, symmetrical, trachea midline, no adenopathy;     thyroid:  no enlargement/tenderness/nodules; no carotid    bruit   Back:     Symmetric, no curvature, ROM normal, no CVA tenderness   Lungs:     Clear to auscultation bilaterally, respirations unlabored   Chest Wall:    No tenderness or deformity    Heart:    Regular rate and rhythm, S1 and S2 normal, no murmur       Abdomen:     Soft, non-tender, bowel sounds active all four quadrants,     no masses, no organomegaly           Extremities:   " Extremities normal, atraumatic, no cyanosis or edema   Pulses:   2+ and symmetric all extremities   Skin:   Skin color, texture, turgor normal, no rashes or lesions   Lymph nodes:   Cervical, supraclavicular, and axillary nodes normal   Neurologic:   Grossly intact, normal strength, sensation and reflexes     throughout     Orders Only on 10/20/2021   Component Date Value Ref Range Status   • WBC 10/21/2021 3.6  3.4 - 10.8 x10E3/uL Final   • RBC 10/21/2021 4.39  3.77 - 5.28 x10E6/uL Final   • Hemoglobin 10/21/2021 13.2  11.1 - 15.9 g/dL Final   • Hematocrit 10/21/2021 40.4  34.0 - 46.6 % Final   • MCV 10/21/2021 92  79 - 97 fL Final   • MCH 10/21/2021 30.1  26.6 - 33.0 pg Final   • MCHC 10/21/2021 32.7  31.5 - 35.7 g/dL Final   • RDW 10/21/2021 13.2  11.7 - 15.4 % Final   • Platelets 10/21/2021 325  150 - 450 x10E3/uL Final   • Neutrophil Rel % 10/21/2021 41  Not Estab. % Final   • Lymphocyte Rel % 10/21/2021 44  Not Estab. % Final   • Monocyte Rel % 10/21/2021 11  Not Estab. % Final   • Eosinophil Rel % 10/21/2021 3  Not Estab. % Final   • Basophil Rel % 10/21/2021 1  Not Estab. % Final   • Neutrophils Absolute 10/21/2021 1.5  1.4 - 7.0 x10E3/uL Final   • Lymphocytes Absolute 10/21/2021 1.6  0.7 - 3.1 x10E3/uL Final   • Monocytes Absolute 10/21/2021 0.4  0.1 - 0.9 x10E3/uL Final   • Eosinophils Absolute 10/21/2021 0.1  0.0 - 0.4 x10E3/uL Final   • Basophils Absolute 10/21/2021 0.0  0.0 - 0.2 x10E3/uL Final   • Immature Granulocyte Rel % 10/21/2021 0  Not Estab. % Final   • Immature Grans Absolute 10/21/2021 0.0  0.0 - 0.1 x10E3/uL Final   • Glucose 10/21/2021 98  65 - 99 mg/dL Final   • BUN 10/21/2021 17  6 - 24 mg/dL Final   • Creatinine 10/21/2021 1.07* 0.57 - 1.00 mg/dL Final   • eGFR Non  Am 10/21/2021 59* >59 mL/min/1.73 Final   • eGFR African Am 10/21/2021 68  >59 mL/min/1.73 Final    Comment: **In accordance with recommendations from the NKF-ASN Task force,**    [facility] is in the process of updating  its eGFR calculation to the    2021 CKD-EPI creatinine equation that estimates kidney function    without a race variable.     • BUN/Creatinine Ratio 10/21/2021 16  9 - 23 Final   • Sodium 10/21/2021 141  134 - 144 mmol/L Final   • Potassium 10/21/2021 4.6  3.5 - 5.2 mmol/L Final   • Chloride 10/21/2021 105  96 - 106 mmol/L Final   • Total CO2 10/21/2021 23  20 - 29 mmol/L Final   • Calcium 10/21/2021 9.3  8.7 - 10.2 mg/dL Final   • Total Protein 10/21/2021 6.8  6.0 - 8.5 g/dL Final   • Albumin 10/21/2021 4.2  3.8 - 4.9 g/dL Final   • Globulin 10/21/2021 2.6  1.5 - 4.5 g/dL Final   • A/G Ratio 10/21/2021 1.6  1.2 - 2.2 Final   • Total Bilirubin 10/21/2021 0.4  0.0 - 1.2 mg/dL Final   • Alkaline Phosphatase 10/21/2021 103  44 - 121 IU/L Final                  **Please note reference interval change**   • AST (SGOT) 10/21/2021 21  0 - 40 IU/L Final   • ALT (SGPT) 10/21/2021 23  0 - 32 IU/L Final   • Hep C Virus Ab 10/21/2021 <0.1  0.0 - 0.9 s/co ratio Final    Comment:                                   Negative:     < 0.8                               Indeterminate: 0.8 - 0.9                                    Positive:     > 0.9   The CDC recommends that a positive HCV antibody result   be followed up with a HCV Nucleic Acid Amplification   test (888355).     • Total Cholesterol 10/21/2021 205* 100 - 199 mg/dL Final   • Triglycerides 10/21/2021 52  0 - 149 mg/dL Final   • HDL Cholesterol 10/21/2021 94  >39 mg/dL Final   • VLDL Cholesterol Rolando 10/21/2021 10  5 - 40 mg/dL Final   • LDL Chol Calc (NIH) 10/21/2021 101* 0 - 99 mg/dL Final   • LDL/HDL RATIO 10/21/2021 1.1  0.0 - 3.2 ratio Final    Comment:                                     LDL/HDL Ratio                                              Men  Women                                1/2 Avg.Risk  1.0    1.5                                    Avg.Risk  3.6    3.2                                 2X Avg.Risk  6.2    5.0                                 3X Avg.Risk  8.0     6.1     • TSH 10/21/2021 1.560  0.450 - 4.500 uIU/mL Final   • 25 Hydroxy, Vitamin D 10/21/2021 16.9* 30.0 - 100.0 ng/mL Final    Comment: Vitamin D deficiency has been defined by the [place] of  [employer]icine and an Endocrine Society practice guideline as a  level of serum 25-OH vitamin D less than 20 ng/mL (1,2).  The Endocrine Society went on to further define vitamin D  insufficiency as a level between 21 and 29 ng/mL (2).  1. IOM ([place] [employer]). 2010. Dietary reference     intakes for calcium and D. [place]: The     National [employer].  2. Neo MF, Randy SANDERS, Cachorro MONTANO, et al.     Evaluation, treatment, and prevention of vitamin D     deficiency: an Endocrine Society clinical practice     guideline. JCEM. 2011 Jul; 96(7):1911-30.       Reviewed labs with patient.      Assessment/Plan   Healthy female exam.      1. Diagnoses and all orders for this visit:    1. Healthcare maintenance (Primary)    2. Vitamin D deficiency    3. Renal insufficiency  -     Basic Metabolic Panel; Future    vit D deficiency - Take vit D3 5000 IU daily    Renal insufficiency - increase water intake.  Do not take any ibuprofen or other NSAIDs.  This may have been due to being on a couple different antibiotics and steroids recently    2. Patient Counseling:  --Nutrition: Stressed importance of moderation in sodium/caffeine intake, saturated fat and cholesterol, caloric balance, sufficient intake of fresh fruits, vegetables, fiber, calcium, iron.  Patient has had success with weight loss with Noom in the past and will try that again  --Exercise: Stressed the importance of regular exercise.   --Injury prevention: Discussed safety belts, safety helmets, smoke detector.   --Dental health: Discussed importance of regular tooth brushing, flossing, and dental visits.  --Immunizations reviewed.    3. Follow up in one month to recheck labs

## 2021-12-02 ENCOUNTER — TELEPHONE (OUTPATIENT)
Dept: INTERNAL MEDICINE | Facility: CLINIC | Age: 51
End: 2021-12-02

## 2021-12-02 DIAGNOSIS — N28.9 RENAL INSUFFICIENCY: Primary | ICD-10-CM

## 2022-03-04 PROBLEM — M72.2 PLANTAR FASCIAL FIBROMATOSIS: Status: ACTIVE | Noted: 2022-03-04

## 2022-03-04 PROBLEM — M79.672 PAIN IN LEFT FOOT: Status: ACTIVE | Noted: 2022-03-04

## 2022-06-10 ENCOUNTER — TELEPHONE (OUTPATIENT)
Dept: INTERNAL MEDICINE | Facility: CLINIC | Age: 52
End: 2022-06-10

## 2022-06-10 ENCOUNTER — TELEMEDICINE (OUTPATIENT)
Dept: INTERNAL MEDICINE | Facility: CLINIC | Age: 52
End: 2022-06-10

## 2022-06-10 VITALS — WEIGHT: 160 LBS | HEIGHT: 65 IN | BODY MASS INDEX: 26.66 KG/M2 | TEMPERATURE: 98.2 F

## 2022-06-10 DIAGNOSIS — U07.1 COVID-19: Primary | ICD-10-CM

## 2022-06-10 DIAGNOSIS — J45.21 MILD INTERMITTENT ASTHMA WITH ACUTE EXACERBATION: ICD-10-CM

## 2022-06-10 PROCEDURE — 99214 OFFICE O/P EST MOD 30 MIN: CPT | Performed by: NURSE PRACTITIONER

## 2022-06-10 RX ORDER — ALBUTEROL SULFATE 90 UG/1
2 AEROSOL, METERED RESPIRATORY (INHALATION) EVERY 4 HOURS PRN
Qty: 8 G | Refills: 6 | Status: SHIPPED | OUTPATIENT
Start: 2022-06-10

## 2022-06-10 RX ORDER — CETIRIZINE HYDROCHLORIDE 10 MG/1
10 TABLET ORAL DAILY
COMMUNITY

## 2022-06-10 NOTE — PROGRESS NOTES
Subjective   Maranda Gillis is a 52 y.o. female. Patient is here today for   Chief Complaint   Patient presents with   • Covid-19 Home Monitoring Video Visit     Pt complains of having fever, body aches, headaches, cough, drainage & fatigue x2 days. Pt tested positive today with a home test.    .    History of Present Illness   You have chosen to receive care through a telehealth visit.  Do you consent to use a video/audio connection for your medical care today? Yes    Patient is located at home  Provider is located at office at [facility].     C/o fever x 2 days associated with body aches, headache, cough, post-nasal drainage, fatigue. Tested positive for Covid today. Taking ibuprofen with minimal relief of her body aches. She does have asthma and takes symbicort daily. She has needed to use her albuterol a few times and is requesting a refill on that    The following portions of the patient's history were reviewed and updated as appropriate: allergies, current medications, past family history, past medical history, past social history, past surgical history and problem list.    Review of Systems    Objective   Vitals:    06/10/22 1543   Temp: 98.2 °F (36.8 °C)     Body mass index is 26.63 kg/m².  Physical Exam  Vitals and nursing note reviewed.   Constitutional:       Appearance: Normal appearance.   Pulmonary:      Effort: Pulmonary effort is normal.   Neurological:      Mental Status: She is alert and oriented to person, place, and time.   Psychiatric:         Mood and Affect: Mood normal.         Behavior: Behavior normal.         Assessment & Plan   Diagnoses and all orders for this visit:    1. COVID-19 (Primary)  -     albuterol sulfate  (90 Base) MCG/ACT inhaler; Inhale 2 puffs Every 4 (Four) Hours As Needed for Wheezing.  Dispense: 8 g; Refill: 6  -     Nirmatrelvir & Ritonavir (PAXLOVID) 20 x 150 MG & 10 x 100MG tablet therapy pack tablet; Take 2 tablets by mouth 2 (Two) Times a Day for 5 days.  Nirmatrelvir 150 mg and ritonavir 100 mg po BID for 5 days  Dispense: 20 each; Refill: 0    2. Mild intermittent asthma with acute exacerbation  -     albuterol sulfate  (90 Base) MCG/ACT inhaler; Inhale 2 puffs Every 4 (Four) Hours As Needed for Wheezing.  Dispense: 8 g; Refill: 6    covid - will treat with Paxlovid. Patient was given a decreased dosage due to her renal function.   Increase water, rest. Go to ER with any increased shortness of breath.

## 2022-06-10 NOTE — TELEPHONE ENCOUNTER
Caller: Maranda Gillis    Relationship to patient: Self    Best call back number: [phone]    Patient is needing: PATIENT STATES THAT SHE THINKS SHE HAS TESTED POSITIVE WITH A HOME COVID TEST, SHE IS HAVING CONGESTION,  DRAINAGE, LOW GRADE FEVER. SHE WOULD LIKE TO KNOW IT SHE NEEDS TO DO A FOLLOW UP TEST IN THE OFFICE. PLEASE REACH OUT TO PATIENT AND ADVISE.

## 2022-06-10 NOTE — TELEPHONE ENCOUNTER
Pt tested positive for covid with a home test. Scheduled for a video visit with Moira this afternoon.

## 2022-07-20 NOTE — TELEPHONE ENCOUNTER
----- Message from ARIE Vasquez sent at 12/2/2021  9:44 AM EST -----  Let patient know that her kidney function is about the same. I would like to recheck BMP in 6 months. Continue to avoid ibuprofen, aleve. She can take tylenol if needed for pain, headaches, etc  
BMP being placed to be repeated in 6 months.   
4 = No assist / stand by assistance

## 2022-11-03 ENCOUNTER — OFFICE VISIT (OUTPATIENT)
Dept: INTERNAL MEDICINE | Facility: CLINIC | Age: 52
End: 2022-11-03

## 2022-11-03 VITALS
BODY MASS INDEX: 27.57 KG/M2 | OXYGEN SATURATION: 98 % | SYSTOLIC BLOOD PRESSURE: 115 MMHG | DIASTOLIC BLOOD PRESSURE: 62 MMHG | WEIGHT: 165.5 LBS | HEART RATE: 68 BPM | TEMPERATURE: 97.3 F | HEIGHT: 65 IN

## 2022-11-03 DIAGNOSIS — Z00.00 HEALTHCARE MAINTENANCE: Primary | ICD-10-CM

## 2022-11-03 DIAGNOSIS — N28.9 RENAL INSUFFICIENCY: ICD-10-CM

## 2022-11-03 DIAGNOSIS — E55.9 VITAMIN D DEFICIENCY: ICD-10-CM

## 2022-11-03 PROCEDURE — 99396 PREV VISIT EST AGE 40-64: CPT | Performed by: NURSE PRACTITIONER

## 2022-11-03 PROCEDURE — 93000 ELECTROCARDIOGRAM COMPLETE: CPT | Performed by: NURSE PRACTITIONER

## 2022-11-03 RX ORDER — LEVOCETIRIZINE DIHYDROCHLORIDE 5 MG/1
5 TABLET, FILM COATED ORAL DAILY
COMMUNITY
Start: 2022-10-01

## 2022-11-03 NOTE — PROGRESS NOTES
"Subjective   Maranda Gillis is a 52 y.o. female and is here for a comprehensive physical exam. The patient reports no problems.    Do you take any herbs or supplements that were not prescribed by a doctor? multivitamin     History:  LMP: No LMP recorded. (Menstrual status: Other).  Last pap date: 2020 Patient sees Dr. Juares  Abnormal pap? no  Patient is in a new relationship and is wanting STD testing prior to becoming sexually active. Denies any concerns or symptoms    The following portions of the patient's history were reviewed and updated as appropriate: allergies, current medications, past family history, past medical history, past social history, past surgical history and problem list.    Review of Systems  Do you have pain that bothers you in your daily life? no  Pertinent items are noted in HPI.    Objective   /62   Pulse 68   Temp 97.3 °F (36.3 °C)   Ht 165.1 cm (65\")   Wt 75.1 kg (165 lb 8 oz)   SpO2 98%   BMI 27.54 kg/m²     General Appearance:    Alert, cooperative, no distress, appears stated age   Head:    Normocephalic, without obvious abnormality, atraumatic   Eyes:    PERRL, conjunctiva/corneas clear, EOM's intact, both eyes   Ears:    Normal TM's and external ear canals, both ears   Nose:   Nares normal, septum midline, mucosa normal, no drainage    or sinus tenderness   Throat:   Lips, mucosa, and tongue normal; teeth and gums normal   Neck:   Supple, symmetrical, trachea midline, no adenopathy;     thyroid:  no enlargement/tenderness/nodules; no carotid    bruit   Back:     Symmetric, no curvature, ROM normal, no CVA tenderness   Lungs:     Clear to auscultation bilaterally, respirations unlabored   Chest Wall:    No tenderness or deformity    Heart:    Regular rate and rhythm, S1 and S2 normal, no murmur       Abdomen:     Soft, non-tender, bowel sounds active all four quadrants,     no masses, no organomegaly           Extremities:   Extremities normal, atraumatic, no cyanosis or " edema   Pulses:   2+ and symmetric all extremities   Skin:   Skin color, texture, turgor normal, no rashes or lesions   Lymph nodes:   Cervical, supraclavicular, and axillary nodes normal   Neurologic:   Grossly intact, normal strength, sensation and reflexes     throughout       ECG 12 Lead    Date/Time: 11/3/2022 9:40 AM  Performed by: Ani Vazquez APRN  Authorized by: Ani Vazquez APRN   Comparison: not compared with previous ECG   Previous ECG: no previous ECG available  Rhythm: sinus rhythm  Rate: normal  BPM: 63  Conduction: conduction normal  ST Segments: ST segments normal  T Waves: T waves normal  QRS axis: normal  Other: no other findings    Clinical impression: normal ECG  Comments: GA interval 204 ms  QRS interval 91 ms  QTc interval 423 ms               Assessment & Plan   Healthy female exam.      1. Diagnoses and all orders for this visit:    1. Healthcare maintenance (Primary)  -     CBC & Differential  -     Comprehensive Metabolic Panel  -     Lipid Panel With / Chol / HDL Ratio  -     Vitamin D,25-Hydroxy  -     HIV-1/O/2 ANTIGEN/ANTIBODY, 4TH GENERATION  -     HSV 1 and 2-Specific Ab, IgG  -     Chlamydia trachomatis, Neisseria gonorrhoeae, Trichomonas vaginalis, PCR - Urine, Urine, Clean Catch  -     ECG 12 Lead    2. Vitamin D deficiency  -     Vitamin D,25-Hydroxy    3. Renal insufficiency  -     Comprehensive Metabolic Panel        2. Patient Counseling:  --Nutrition: Stressed importance of moderation in sodium/caffeine intake, saturated fat and cholesterol, caloric balance, sufficient intake of fresh fruits, vegetables, fiber, calcium, iron. Noom  --Exercise: Stressed the importance of regular exercise. Walks  --Dental health: Discussed importance of regular tooth brushing, flossing, and dental visits.  --Immunizations reviewed.    3. Follow up next physical in 1 year or sooner if needed. Will call with lab results

## 2022-11-04 ENCOUNTER — TELEPHONE (OUTPATIENT)
Dept: INTERNAL MEDICINE | Facility: CLINIC | Age: 52
End: 2022-11-04

## 2022-11-04 DIAGNOSIS — R74.8 ELEVATED LIVER ENZYMES: Primary | ICD-10-CM

## 2022-11-04 DIAGNOSIS — N28.9 RENAL INSUFFICIENCY: ICD-10-CM

## 2022-11-04 LAB
25(OH)D3+25(OH)D2 SERPL-MCNC: 37 NG/ML (ref 30–100)
ALBUMIN SERPL-MCNC: 4.7 G/DL (ref 3.5–5.2)
ALBUMIN/GLOB SERPL: 1.9 G/DL
ALP SERPL-CCNC: 138 U/L (ref 39–117)
ALT SERPL-CCNC: 71 U/L (ref 1–33)
AST SERPL-CCNC: 52 U/L (ref 1–32)
BASOPHILS # BLD AUTO: 0.04 10*3/MM3 (ref 0–0.2)
BASOPHILS NFR BLD AUTO: 1 % (ref 0–1.5)
BILIRUB SERPL-MCNC: 0.4 MG/DL (ref 0–1.2)
BUN SERPL-MCNC: 16 MG/DL (ref 6–20)
BUN/CREAT SERPL: 14.4 (ref 7–25)
CALCIUM SERPL-MCNC: 9.7 MG/DL (ref 8.6–10.5)
CHLORIDE SERPL-SCNC: 104 MMOL/L (ref 98–107)
CHOLEST SERPL-MCNC: 206 MG/DL (ref 0–200)
CHOLEST/HDLC SERPL: 2.12 {RATIO}
CO2 SERPL-SCNC: 27.3 MMOL/L (ref 22–29)
CREAT SERPL-MCNC: 1.11 MG/DL (ref 0.57–1)
EGFRCR SERPLBLD CKD-EPI 2021: 59.2 ML/MIN/1.73
EOSINOPHIL # BLD AUTO: 0.03 10*3/MM3 (ref 0–0.4)
EOSINOPHIL NFR BLD AUTO: 0.8 % (ref 0.3–6.2)
ERYTHROCYTE [DISTWIDTH] IN BLOOD BY AUTOMATED COUNT: 13 % (ref 12.3–15.4)
GLOBULIN SER CALC-MCNC: 2.5 GM/DL
GLUCOSE SERPL-MCNC: 97 MG/DL (ref 65–99)
HCT VFR BLD AUTO: 39.3 % (ref 34–46.6)
HDLC SERPL-MCNC: 97 MG/DL (ref 40–60)
HGB BLD-MCNC: 13.3 G/DL (ref 12–15.9)
HIV 1+2 AB+HIV1 P24 AG SERPL QL IA: NON REACTIVE
HSV1 IGG SER IA-ACNC: <0.91 INDEX (ref 0–0.9)
HSV2 IGG SER IA-ACNC: <0.91 INDEX (ref 0–0.9)
IMM GRANULOCYTES # BLD AUTO: 0.01 10*3/MM3 (ref 0–0.05)
IMM GRANULOCYTES NFR BLD AUTO: 0.3 % (ref 0–0.5)
LDLC SERPL CALC-MCNC: 98 MG/DL (ref 0–100)
LYMPHOCYTES # BLD AUTO: 1.77 10*3/MM3 (ref 0.7–3.1)
LYMPHOCYTES NFR BLD AUTO: 45.7 % (ref 19.6–45.3)
MCH RBC QN AUTO: 31.5 PG (ref 26.6–33)
MCHC RBC AUTO-ENTMCNC: 33.8 G/DL (ref 31.5–35.7)
MCV RBC AUTO: 93.1 FL (ref 79–97)
MONOCYTES # BLD AUTO: 0.31 10*3/MM3 (ref 0.1–0.9)
MONOCYTES NFR BLD AUTO: 8 % (ref 5–12)
NEUTROPHILS # BLD AUTO: 1.71 10*3/MM3 (ref 1.7–7)
NEUTROPHILS NFR BLD AUTO: 44.2 % (ref 42.7–76)
NRBC BLD AUTO-RTO: 0 /100 WBC (ref 0–0.2)
PLATELET # BLD AUTO: 302 10*3/MM3 (ref 140–450)
POTASSIUM SERPL-SCNC: 4.2 MMOL/L (ref 3.5–5.2)
PROT SERPL-MCNC: 7.2 G/DL (ref 6–8.5)
RBC # BLD AUTO: 4.22 10*6/MM3 (ref 3.77–5.28)
SODIUM SERPL-SCNC: 140 MMOL/L (ref 136–145)
TRIGL SERPL-MCNC: 63 MG/DL (ref 0–150)
VLDLC SERPL CALC-MCNC: 11 MG/DL (ref 5–40)
WBC # BLD AUTO: 3.87 10*3/MM3 (ref 3.4–10.8)

## 2022-11-04 NOTE — TELEPHONE ENCOUNTER
----- Message from Nesha Vaughn MA sent at 11/4/2022  9:06 AM EDT -----  Patient is aware, and is okay with getting the US.

## 2022-11-05 LAB
C TRACH RRNA SPEC QL NAA+PROBE: NEGATIVE
N GONORRHOEA RRNA SPEC QL NAA+PROBE: NEGATIVE
T VAGINALIS RRNA SPEC QL NAA+PROBE: NEGATIVE

## 2022-11-11 ENCOUNTER — HOSPITAL ENCOUNTER (OUTPATIENT)
Dept: ULTRASOUND IMAGING | Facility: HOSPITAL | Age: 52
Discharge: HOME | End: 2022-11-11
Admitting: NURSE PRACTITIONER

## 2022-11-11 DIAGNOSIS — N28.9 RENAL INSUFFICIENCY: ICD-10-CM

## 2022-11-11 DIAGNOSIS — R74.8 ELEVATED LIVER ENZYMES: ICD-10-CM

## 2022-11-11 PROCEDURE — 76700 US EXAM ABDOM COMPLETE: CPT

## 2022-11-17 ENCOUNTER — TELEPHONE (OUTPATIENT)
Dept: INTERNAL MEDICINE | Facility: CLINIC | Age: 52
End: 2022-11-17

## 2022-11-17 DIAGNOSIS — R74.8 ELEVATED LIVER ENZYMES: Primary | ICD-10-CM

## 2022-11-17 NOTE — TELEPHONE ENCOUNTER
Caller: Maranda Gillis    Relationship: Self    Best call back number: [phone]    Caller requesting test results: SELF    What test was performed: ULTRA SOUND    When was the test performed: 11/11    Where was the test performed: HOSPITAL    Additional notes: PLEASE CALL WITH RESULTS

## 2023-01-02 ENCOUNTER — TELEMEDICINE (OUTPATIENT)
Dept: FAMILY MEDICINE CLINIC | Facility: TELEHEALTH | Age: 53
End: 2023-01-02
Payer: COMMERCIAL

## 2023-01-02 DIAGNOSIS — J01.00 ACUTE MAXILLARY SINUSITIS, RECURRENCE NOT SPECIFIED: Primary | ICD-10-CM

## 2023-01-02 PROCEDURE — 99213 OFFICE O/P EST LOW 20 MIN: CPT | Performed by: NURSE PRACTITIONER

## 2023-01-02 RX ORDER — DEXTROMETHORPHAN HYDROBROMIDE AND PROMETHAZINE HYDROCHLORIDE 15; 6.25 MG/5ML; MG/5ML
5 SYRUP ORAL NIGHTLY PRN
Qty: 150 ML | Refills: 0 | Status: SHIPPED | OUTPATIENT
Start: 2023-01-02 | End: 2023-03-30

## 2023-01-02 RX ORDER — AMOXICILLIN AND CLAVULANATE POTASSIUM 875; 125 MG/1; MG/1
1 TABLET, FILM COATED ORAL 2 TIMES DAILY
Qty: 20 TABLET | Refills: 0 | Status: SHIPPED | OUTPATIENT
Start: 2023-01-02 | End: 2023-01-12

## 2023-01-02 RX ORDER — METHYLPREDNISOLONE 4 MG/1
TABLET ORAL
Qty: 21 TABLET | Refills: 0 | Status: SHIPPED | OUTPATIENT
Start: 2023-01-02 | End: 2023-03-30

## 2023-01-02 NOTE — PROGRESS NOTES
You have chosen to receive care through a telehealth visit.  Do you consent to use a video/audio connection for your medical care today? Yes     CHIEF COMPLAINT  No chief complaint on file.        HPI  Maranda Gillis is a 52 y.o. female  presents with complaint of 1 week history of nasal congestion with yellow/clear discharge, facial pain/pressure, jaw is aching, PND, ear fullness, possible low-grade fever, but unsure.  Cough at night which is worse at night.  Denies shortness of breath , wheezing.     Review of Systems   See HPI    Past Medical History:   Diagnosis Date   • Allergic    • Colon polyp    • Migraine     with OCPS   • Ovarian cyst    • Vitamin D deficiency        Family History   Problem Relation Age of Onset   • Diabetes Mother    • Melanoma Mother    • COPD Father    • Emphysema Father    • Asthma Sister    • Breast cancer Neg Hx    • Ovarian cancer Neg Hx    • Colon cancer Neg Hx    • Deep vein thrombosis Neg Hx    • Pulmonary embolism Neg Hx        Social History     Socioeconomic History   • Marital status:    Tobacco Use   • Smoking status: Never   • Smokeless tobacco: Never   Vaping Use   • Vaping Use: Never used   Substance and Sexual Activity   • Alcohol use: Yes     Comment: SOCIALLY   • Drug use: No   • Sexual activity: Yes     Partners: Male     Birth control/protection: None       Maranda Gillis  reports that she has never smoked. She has never used smokeless tobacco..               There were no vitals taken for this visit.    PHYSICAL EXAM  Physical Exam   Constitutional: She is oriented to person, place, and time. She appears well-developed and well-nourished. She does not have a sickly appearance. She does not appear ill.   HENT:   Head: Normocephalic and atraumatic.   Maxillary sinus tenderness   Pulmonary/Chest: Effort normal.  No respiratory distress.  Neurological: She is alert and oriented to person, place, and time.         Diagnoses and all orders for this visit:    1. Acute  maxillary sinusitis, recurrence not specified (Primary)  -     amoxicillin-clavulanate (AUGMENTIN) 875-125 MG per tablet; Take 1 tablet by mouth 2 (Two) Times a Day for 10 days.  Dispense: 20 tablet; Refill: 0  -     methylPREDNISolone (MEDROL) 4 MG dose pack; Take as directed on package instructions.  Dispense: 21 tablet; Refill: 0  -     promethazine-dextromethorphan (PROMETHAZINE-DM) 6.25-15 MG/5ML syrup; Take 5 mL by mouth At Night As Needed for Cough.  Dispense: 150 mL; Refill: 0    --take medications as prescribed  --increase fluids, rest as needed, tylenol or ibuprofen for pain  --f/u in 5-7 days if no improvement        FOLLOW-UP  As discussed during visit with PCP/[facility] if no improvement or Urgent Care/Emergency Department if worsening of symptoms    Patient verbalizes understanding of medication dosage, comfort measures, instructions for treatment and follow-up.    Michelle Luna, ARIE  01/02/2023  09:28 EST    The use of a video visit has been reviewed with the patient and verbal informed consent has been obtained. Myself and Maranda Gillis participated in this visit. The patient is located in [address] [place] [address].    I am located in [place], [place]. Mychart and Zoom were utilized. I spent 8 minutes in the patient's chart for this visit.

## 2023-01-02 NOTE — PATIENT INSTRUCTIONS
Sinusitis, Adult  Sinusitis is inflammation of your sinuses. Sinuses are hollow spaces in the bones around your face. Your sinuses are located:  Around your eyes.  In the middle of your forehead.  Behind your nose.  In your cheekbones.  Mucus normally drains out of your sinuses. When your nasal tissues become inflamed or swollen, mucus can become trapped or blocked. This allows bacteria, viruses, and fungi to grow, which leads to infection. Most infections of the sinuses are caused by a virus.  Sinusitis can develop quickly. It can last for up to 4 weeks (acute) or for more than 12 weeks (chronic). Sinusitis often develops after a cold.  What are the causes?  This condition is caused by anything that creates swelling in the sinuses or stops mucus from draining. This includes:  Allergies.  Asthma.  Infection from bacteria or viruses.  Deformities or blockages in your nose or sinuses.  Abnormal growths in the nose (nasal polyps).  Pollutants, such as chemicals or irritants in the air.  Infection from fungi (rare).  What increases the risk?  You are more likely to develop this condition if you:  Have a weak body defense system (immune system).  Do a lot of swimming or diving.  Overuse nasal sprays.  Smoke.  What are the signs or symptoms?  The main symptoms of this condition are pain and a feeling of pressure around the affected sinuses. Other symptoms include:  Stuffy nose or congestion.  Thick drainage from your nose.  Swelling and warmth over the affected sinuses.  Headache.  Upper toothache.  A cough that may get worse at night.  Extra mucus that collects in the throat or the back of the nose (postnasal drip).  Decreased sense of smell and taste.  Fatigue.  A fever.  Sore throat.  Bad breath.  How is this diagnosed?  This condition is diagnosed based on:  Your symptoms.  Your medical history.  A physical exam.  Tests to find out if your condition is acute or chronic. This may include:  Checking your nose for nasal  polyps.  Viewing your sinuses using a device that has a light (endoscope).  Testing for allergies or bacteria.  Imaging tests, such as an MRI or CT scan.  In rare cases, a bone biopsy may be done to rule out more serious types of fungal sinus disease.  How is this treated?  Treatment for sinusitis depends on the cause and whether your condition is chronic or acute.  If caused by a virus, your symptoms should go away on their own within 10 days. You may be given medicines to relieve symptoms. They include:  Medicines that shrink swollen nasal passages (topical intranasal decongestants).  Medicines that treat allergies (antihistamines).  A spray that eases inflammation of the nostrils (topical intranasal corticosteroids).  Rinses that help get rid of thick mucus in your nose (nasal saline washes).  If caused by bacteria, your health care provider may recommend waiting to see if your symptoms improve. Most bacterial infections will get better without antibiotic medicine. You may be given antibiotics if you have:  A severe infection.  A weak immune system.  If caused by narrow nasal passages or nasal polyps, you may need to have surgery.  Follow these instructions at home:  Medicines  Take, use, or apply over-the-counter and prescription medicines only as told by your health care provider. These may include nasal sprays.  If you were prescribed an antibiotic medicine, take it as told by your health care provider. Do not stop taking the antibiotic even if you start to feel better.  Hydrate and humidify    Drink enough fluid to keep your urine pale yellow. Staying hydrated will help to thin your mucus.  Use a cool mist humidifier to keep the humidity level in your home above 50%.  Inhale steam for 10-15 minutes, 3-4 times a day, or as told by your health care provider. You can do this in the bathroom while a hot shower is running.  Limit your exposure to cool or dry air.  Rest  Rest as much as possible.  Sleep with your  head raised (elevated).  Make sure you get enough sleep each night.  General instructions    Apply a warm, moist washcloth to your face 3-4 times a day or as told by your health care provider. This will help with discomfort.  Wash your hands often with soap and water to reduce your exposure to germs. If soap and water are not available, use hand .  Do not smoke. Avoid being around people who are smoking (secondhand smoke).  Keep all follow-up visits as told by your health care provider. This is important.  Contact a health care provider if:  You have a fever.  Your symptoms get worse.  Your symptoms do not improve within 10 days.  Get help right away if:  You have a severe headache.  You have persistent vomiting.  You have severe pain or swelling around your face or eyes.  You have vision problems.  You develop confusion.  Your neck is stiff.  You have trouble breathing.  Summary  Sinusitis is soreness and inflammation of your sinuses. Sinuses are hollow spaces in the bones around your face.  This condition is caused by nasal tissues that become inflamed or swollen. The swelling traps or blocks the flow of mucus. This allows bacteria, viruses, and fungi to grow, which leads to infection.  If you were prescribed an antibiotic medicine, take it as told by your health care provider. Do not stop taking the antibiotic even if you start to feel better.  Keep all follow-up visits as told by your health care provider. This is important.  This information is not intended to replace advice given to you by your health care provider. Make sure you discuss any questions you have with your health care provider.  Document Revised: 05/20/2019 Document Reviewed: 05/20/2019  [employer] Patient Education © 2022 [employer]

## 2023-01-12 ENCOUNTER — LAB (OUTPATIENT)
Dept: LAB | Facility: HOSPITAL | Age: 53
End: 2023-01-12
Payer: COMMERCIAL

## 2023-01-12 ENCOUNTER — OFFICE VISIT (OUTPATIENT)
Dept: GASTROENTEROLOGY | Facility: CLINIC | Age: 53
End: 2023-01-12
Payer: COMMERCIAL

## 2023-01-12 VITALS
SYSTOLIC BLOOD PRESSURE: 120 MMHG | OXYGEN SATURATION: 98 % | HEART RATE: 80 BPM | HEIGHT: 65 IN | WEIGHT: 167.3 LBS | TEMPERATURE: 97.8 F | DIASTOLIC BLOOD PRESSURE: 76 MMHG | BODY MASS INDEX: 27.88 KG/M2

## 2023-01-12 DIAGNOSIS — K76.9 LIVER LESION: ICD-10-CM

## 2023-01-12 DIAGNOSIS — R93.2 ABNORMAL LIVER ULTRASOUND: ICD-10-CM

## 2023-01-12 DIAGNOSIS — R74.8 ELEVATED LIVER ENZYMES: Primary | ICD-10-CM

## 2023-01-12 LAB
ALBUMIN SERPL-MCNC: 4 G/DL (ref 3.5–5.2)
ALBUMIN/GLOB SERPL: 1.4 G/DL
ALP SERPL-CCNC: 101 U/L (ref 39–117)
ALPHA1 GLOB MFR UR ELPH: 120 MG/DL (ref 90–200)
ALT SERPL W P-5'-P-CCNC: 23 U/L (ref 1–33)
ANION GAP SERPL CALCULATED.3IONS-SCNC: 5.6 MMOL/L (ref 5–15)
AST SERPL-CCNC: 23 U/L (ref 1–32)
BASOPHILS # BLD AUTO: 0.05 10*3/MM3 (ref 0–0.2)
BASOPHILS NFR BLD AUTO: 0.7 % (ref 0–1.5)
BILIRUB SERPL-MCNC: 0.2 MG/DL (ref 0–1.2)
BUN SERPL-MCNC: 22 MG/DL (ref 6–20)
BUN/CREAT SERPL: 17.9 (ref 7–25)
CALCIUM SPEC-SCNC: 9.8 MG/DL (ref 8.6–10.5)
CERULOPLASMIN SERPL-MCNC: 23 MG/DL (ref 19–39)
CHLORIDE SERPL-SCNC: 106 MMOL/L (ref 98–107)
CO2 SERPL-SCNC: 28.4 MMOL/L (ref 22–29)
CREAT SERPL-MCNC: 1.23 MG/DL (ref 0.57–1)
DEPRECATED RDW RBC AUTO: 46 FL (ref 37–54)
EGFRCR SERPLBLD CKD-EPI 2021: 52.3 ML/MIN/1.73
EOSINOPHIL # BLD AUTO: 0.09 10*3/MM3 (ref 0–0.4)
EOSINOPHIL NFR BLD AUTO: 1.2 % (ref 0.3–6.2)
ERYTHROCYTE [DISTWIDTH] IN BLOOD BY AUTOMATED COUNT: 13.3 % (ref 12.3–15.4)
FERRITIN SERPL-MCNC: 232 NG/ML (ref 13–150)
GGT SERPL-CCNC: 37 U/L (ref 5–36)
GLOBULIN UR ELPH-MCNC: 2.9 GM/DL
GLUCOSE SERPL-MCNC: 97 MG/DL (ref 65–99)
HAV IGM SERPL QL IA: NORMAL
HBV CORE IGM SERPL QL IA: NORMAL
HBV SURFACE AG SERPL QL IA: NORMAL
HCT VFR BLD AUTO: 39.2 % (ref 34–46.6)
HCV AB SER DONR QL: NORMAL
HGB BLD-MCNC: 12.8 G/DL (ref 12–15.9)
IGA1 MFR SER: 125 MG/DL (ref 70–400)
IGG1 SER-MCNC: 945 MG/DL (ref 700–1600)
IGM SERPL-MCNC: 74 MG/DL (ref 40–230)
IMM GRANULOCYTES # BLD AUTO: 0.03 10*3/MM3 (ref 0–0.05)
IMM GRANULOCYTES NFR BLD AUTO: 0.4 % (ref 0–0.5)
IRON 24H UR-MRATE: 113 MCG/DL (ref 37–145)
IRON SATN MFR SERPL: 32 % (ref 20–50)
LYMPHOCYTES # BLD AUTO: 2.72 10*3/MM3 (ref 0.7–3.1)
LYMPHOCYTES NFR BLD AUTO: 35.9 % (ref 19.6–45.3)
MCH RBC QN AUTO: 30.3 PG (ref 26.6–33)
MCHC RBC AUTO-ENTMCNC: 32.7 G/DL (ref 31.5–35.7)
MCV RBC AUTO: 92.9 FL (ref 79–97)
MONOCYTES # BLD AUTO: 0.57 10*3/MM3 (ref 0.1–0.9)
MONOCYTES NFR BLD AUTO: 7.5 % (ref 5–12)
NEUTROPHILS NFR BLD AUTO: 4.11 10*3/MM3 (ref 1.7–7)
NEUTROPHILS NFR BLD AUTO: 54.3 % (ref 42.7–76)
NRBC BLD AUTO-RTO: 0 /100 WBC (ref 0–0.2)
PLATELET # BLD AUTO: 337 10*3/MM3 (ref 140–450)
PMV BLD AUTO: 9.4 FL (ref 6–12)
POTASSIUM SERPL-SCNC: 4.3 MMOL/L (ref 3.5–5.2)
PROT SERPL-MCNC: 6.9 G/DL (ref 6–8.5)
RBC # BLD AUTO: 4.22 10*6/MM3 (ref 3.77–5.28)
SODIUM SERPL-SCNC: 140 MMOL/L (ref 136–145)
TIBC SERPL-MCNC: 356 MCG/DL (ref 298–536)
TRANSFERRIN SERPL-MCNC: 239 MG/DL (ref 200–360)
WBC NRBC COR # BLD: 7.57 10*3/MM3 (ref 3.4–10.8)

## 2023-01-12 PROCEDURE — 82728 ASSAY OF FERRITIN: CPT | Performed by: NURSE PRACTITIONER

## 2023-01-12 PROCEDURE — 86364 TISS TRNSGLTMNASE EA IG CLAS: CPT | Performed by: NURSE PRACTITIONER

## 2023-01-12 PROCEDURE — 86381 MITOCHONDRIAL ANTIBODY EACH: CPT | Performed by: NURSE PRACTITIONER

## 2023-01-12 PROCEDURE — 83540 ASSAY OF IRON: CPT | Performed by: NURSE PRACTITIONER

## 2023-01-12 PROCEDURE — 84466 ASSAY OF TRANSFERRIN: CPT | Performed by: NURSE PRACTITIONER

## 2023-01-12 PROCEDURE — 86015 ACTIN ANTIBODY EACH: CPT | Performed by: NURSE PRACTITIONER

## 2023-01-12 PROCEDURE — 82977 ASSAY OF GGT: CPT | Performed by: NURSE PRACTITIONER

## 2023-01-12 PROCEDURE — 36415 COLL VENOUS BLD VENIPUNCTURE: CPT | Performed by: NURSE PRACTITIONER

## 2023-01-12 PROCEDURE — 86038 ANTINUCLEAR ANTIBODIES: CPT | Performed by: NURSE PRACTITIONER

## 2023-01-12 PROCEDURE — 80074 ACUTE HEPATITIS PANEL: CPT | Performed by: NURSE PRACTITIONER

## 2023-01-12 PROCEDURE — 80053 COMPREHEN METABOLIC PANEL: CPT | Performed by: NURSE PRACTITIONER

## 2023-01-12 PROCEDURE — 85025 COMPLETE CBC W/AUTO DIFF WBC: CPT | Performed by: NURSE PRACTITIONER

## 2023-01-12 PROCEDURE — 86231 EMA EACH IG CLASS: CPT | Performed by: NURSE PRACTITIONER

## 2023-01-12 PROCEDURE — 82390 ASSAY OF CERULOPLASMIN: CPT | Performed by: NURSE PRACTITIONER

## 2023-01-12 PROCEDURE — 99214 OFFICE O/P EST MOD 30 MIN: CPT | Performed by: NURSE PRACTITIONER

## 2023-01-12 PROCEDURE — 82784 ASSAY IGA/IGD/IGG/IGM EACH: CPT | Performed by: NURSE PRACTITIONER

## 2023-01-12 PROCEDURE — 82103 ALPHA-1-ANTITRYPSIN TOTAL: CPT | Performed by: NURSE PRACTITIONER

## 2023-01-12 NOTE — PROGRESS NOTES
"Chief Complaint   Patient presents with   • Elevated Hepatic Enzymes         History of Present Illness  52-year-old female presents the office today for evaluation of elevated liver enzymes.  Lab work on 11/3/2022 demonstrated a mildly elevated alkaline phosphatase at 138, elevated AST at 52 and elevated ALT at 71.  These are new findings for this patient.  She underwent a complete abdominal ultrasound on 11/11/2022 demonstrating a hyperechoic subcapsular nodular area within the liver corresponding to a 12 mm hypodense solid area on prior CT, which could be reflective of a hepatic hemangioma.  She was recommended to undergo liver CT for further evaluation. She drinks approximately 1 glass of wine about 4 x per week. She denies any IV drug use or tattoos. She denies any family history of liver disease.  She denies any family history of hemochromatosis.  She does not use Tylenol regularly.  She does report having COVID June 2022.  Previous liver enzymes were all normal.  She denies any new medications or herbal medications.    Last colonoscopy was performed October 2019 and was noted to have 2-3 polyps. She was recommended to have her next surveillance colonoscopy at a 5 yr interval, which would be due October 2024. She denies any changes in her bowel habits. She feels she may have a hemorrhoid that occasionally flares. She does have diverticulosis and tries to limit nuts.     She denies any upper GI symptoms.     Review of Systems      Result Review :       Comprehensive Metabolic Panel (11/03/2022 10:02)  US Abdomen Complete (11/11/2022 12:07)    Vital Signs:   /76   Pulse 80   Temp 97.8 °F (36.6 °C)   Ht 165.1 cm (65\")   Wt 75.9 kg (167 lb 4.8 oz)   SpO2 98%   BMI 27.84 kg/m²     Body mass index is 27.84 kg/m².     Physical Exam  Vitals reviewed.   Constitutional:       Appearance: Normal appearance.   HENT:      Head: Normocephalic.      Nose: Nose normal.      Mouth/Throat:      Mouth: Mucous " membranes are moist.   Eyes:      General: No scleral icterus.     Extraocular Movements: Extraocular movements intact.   Cardiovascular:      Rate and Rhythm: Normal rate and regular rhythm.      Pulses: Normal pulses.      Heart sounds: Normal heart sounds.   Pulmonary:      Effort: Pulmonary effort is normal. No respiratory distress.      Breath sounds: Normal breath sounds.   Abdominal:      General: Abdomen is flat. Bowel sounds are normal. There is no distension.      Palpations: Abdomen is soft. There is no mass.      Tenderness: There is no abdominal tenderness. There is no guarding.   Musculoskeletal:         General: Normal range of motion.      Cervical back: Normal range of motion and neck supple.   Skin:     General: Skin is warm and dry.   Neurological:      General: No focal deficit present.      Mental Status: She is alert and oriented to person, place, and time.   Psychiatric:         Mood and Affect: Mood normal.         Behavior: Behavior normal.         Thought Content: Thought content normal.         Judgment: Judgment normal.       Assessment and Plan    Diagnoses and all orders for this visit:    1. Elevated liver enzymes (Primary)  -     Alpha - 1 - Antitrypsin  -     JAS  -     Anti-Smooth Muscle Antibody Titer  -     CBC & Differential  -     Comprehensive Metabolic Panel  -     Ferritin  -     Celiac Disease Panel  -     Ceruloplasmin  -     Gamma GT  -     Hepatitis Panel, Acute  -     IgG, IgA, IgM  -     Iron Profile  -     Mitochondrial Antibodies, M2  -     CT Abdomen Liver With & Without Contrast; Future    2. Abnormal liver ultrasound  -     Alpha - 1 - Antitrypsin  -     JAS  -     Anti-Smooth Muscle Antibody Titer  -     CBC & Differential  -     Comprehensive Metabolic Panel  -     Ferritin  -     Celiac Disease Panel  -     Ceruloplasmin  -     Gamma GT  -     Hepatitis Panel, Acute  -     IgG, IgA, IgM  -     Iron Profile  -     Mitochondrial Antibodies, M2  -     CT Abdomen  Liver With & Without Contrast; Future    3. Liver lesion           Patient Instructions   1.  For further evaluation of elevated liver enzymes we will check a full liver lab work up. We will contact you with results once all your labs have returned.     2.   For further evaluation of liver lesion we will proceed with a CT scan of the liver. You will be contacted to schedule this imaging test and we will contact you with results once they return.    3.   Next colonoscopy due October 2024 due to your history of colon polyps, and we have placed you in recall accordingly.     4.   Next office follow up to be determined based upon imaging and lab work.      Discussion:    We will proceed with dedicated CT scan of the liver for further evaluation of lesion noted on ultrasound.  We will also complete a full liver lab work-up.  There is no evidence of fatty liver on ultrasound or cirrhosis.  Patient drinks approximately 4 glasses of wine per week, which is not an excess.  She did have COVID this past June, so it is a possibility that her levels could be elevated secondary to COVID 19.  Additional recommendations pending outcome of imaging and lab work.  Patient verbalized understand above plan of care and is in agreement.  All questions answered and support provided.     EMR Dragon/Transcription Disclaimer:  This document has been Dictated utilizing Dragon dictation.

## 2023-01-12 NOTE — PATIENT INSTRUCTIONS
For further evaluation of elevated liver enzymes we will check a full liver lab work up. We will contact you with results once all your labs have returned.     2.   For further evaluation of liver lesion we will proceed with a CT scan of the liver. You will be contacted to schedule this imaging test and we will contact you with results once they return.    3.   Next colonoscopy due October 2024 due to your history of colon polyps, and we have placed you in recall accordingly.     4.   Next office follow up to be determined based upon imaging and lab work.

## 2023-01-14 LAB
ENDOMYSIUM IGA SER QL: NEGATIVE
IGA SERPL-MCNC: 122 MG/DL (ref 87–352)
MITOCHONDRIA M2 IGG SER-ACNC: <20 UNITS (ref 0–20)
SMA IGG SER-ACNC: 9 UNITS (ref 0–19)
TTG IGA SER-ACNC: 3 U/ML (ref 0–3)

## 2023-01-16 LAB — ANA SER QL: NEGATIVE

## 2023-01-17 DIAGNOSIS — R79.89 ELEVATED FERRITIN LEVEL: Primary | ICD-10-CM

## 2023-01-17 DIAGNOSIS — R74.8 ELEVATED LIVER ENZYMES: ICD-10-CM

## 2023-01-20 ENCOUNTER — HOSPITAL ENCOUNTER (OUTPATIENT)
Dept: CT IMAGING | Facility: HOSPITAL | Age: 53
Discharge: HOME | End: 2023-01-20
Admitting: NURSE PRACTITIONER
Payer: COMMERCIAL

## 2023-01-20 DIAGNOSIS — R93.2 ABNORMAL LIVER ULTRASOUND: ICD-10-CM

## 2023-01-20 DIAGNOSIS — R74.8 ELEVATED LIVER ENZYMES: ICD-10-CM

## 2023-01-20 PROCEDURE — 25010000002 IOPAMIDOL 61 % SOLUTION: Performed by: NURSE PRACTITIONER

## 2023-01-20 PROCEDURE — 74170 CT ABD WO CNTRST FLWD CNTRST: CPT

## 2023-01-20 RX ADMIN — IOPAMIDOL 100 ML: 612 INJECTION, SOLUTION INTRAVENOUS at 12:18

## 2023-01-24 ENCOUNTER — LAB (OUTPATIENT)
Dept: LAB | Facility: HOSPITAL | Age: 53
End: 2023-01-24
Payer: COMMERCIAL

## 2023-01-24 PROCEDURE — 81256 HFE GENE: CPT | Performed by: NURSE PRACTITIONER

## 2023-01-27 DIAGNOSIS — N28.9 RENAL INSUFFICIENCY: Primary | ICD-10-CM

## 2023-01-31 DIAGNOSIS — R74.8 ELEVATED LIVER ENZYMES: Primary | ICD-10-CM

## 2023-01-31 DIAGNOSIS — R79.89 ELEVATED FERRITIN LEVEL: ICD-10-CM

## 2023-01-31 DIAGNOSIS — R89.8 ABNORMAL GENETIC TEST: ICD-10-CM

## 2023-03-28 ENCOUNTER — TELEPHONE (OUTPATIENT)
Dept: GASTROENTEROLOGY | Facility: CLINIC | Age: 53
End: 2023-03-28

## 2023-03-28 ENCOUNTER — TELEPHONE (OUTPATIENT)
Dept: INTERNAL MEDICINE | Facility: CLINIC | Age: 53
End: 2023-03-28
Payer: COMMERCIAL

## 2023-03-28 NOTE — TELEPHONE ENCOUNTER
Caller: DIPESH    Relationship: WOR[employer] FOR [employer]    Best call back number: [phone]    Who are you requesting to speak with (clinical staff, provider,  specific staff member): CLINICAL        What was the call regarding: What was the call regarding: ASHWINI FROM [employer] STATED THAT THEY ARE NEEDING A BACK AUTH ON AN X-RAY THAT WAS DONE ON 01/20/23. SHE STATED THAT THEY ARE TRYING TO BILL THE PATIENT AND THEY ARE NEEDING IT TO BE HELD OFF AND NEED YOU TO CALL. AIM ON HOLD AND NEED IS BACKDATED.

## 2023-03-28 NOTE — TELEPHONE ENCOUNTER
Caller: Maranda Gillis    Relationship to patient: Self    Best call back number: [phone]    Patient is needing: PATIENT WAS REFERRED TO A LIVER SPECIALIST AND SHE GOT A BILL STATING THAT IT WASN'T APPROVED BY THE DR.  CAN YOU CALL AND DISCUSS THIS WITH HER?

## 2023-03-30 ENCOUNTER — OFFICE VISIT (OUTPATIENT)
Dept: INTERNAL MEDICINE | Facility: CLINIC | Age: 53
End: 2023-03-30
Payer: COMMERCIAL

## 2023-03-30 ENCOUNTER — HOSPITAL ENCOUNTER (OUTPATIENT)
Dept: GENERAL RADIOLOGY | Facility: HOSPITAL | Age: 53
Discharge: HOME | End: 2023-03-30
Payer: COMMERCIAL

## 2023-03-30 VITALS
BODY MASS INDEX: 27.89 KG/M2 | HEIGHT: 65 IN | WEIGHT: 167.4 LBS | HEART RATE: 72 BPM | TEMPERATURE: 97.1 F | SYSTOLIC BLOOD PRESSURE: 102 MMHG | DIASTOLIC BLOOD PRESSURE: 72 MMHG | OXYGEN SATURATION: 96 %

## 2023-03-30 DIAGNOSIS — M89.8X1 PAIN OF RIGHT CLAVICLE: Primary | ICD-10-CM

## 2023-03-30 DIAGNOSIS — M89.8X1 PAIN OF RIGHT CLAVICLE: ICD-10-CM

## 2023-03-30 PROCEDURE — 73000 X-RAY EXAM OF COLLAR BONE: CPT

## 2023-03-30 PROCEDURE — 99213 OFFICE O/P EST LOW 20 MIN: CPT | Performed by: NURSE PRACTITIONER

## 2023-03-30 PROCEDURE — 73030 X-RAY EXAM OF SHOULDER: CPT

## 2023-03-30 NOTE — PROGRESS NOTES
Subjective   Maranda Gillis is a 52 y.o. female. Patient is here today for   Chief Complaint   Patient presents with   • Shoulder Pain     Hx of right shoulder issues (she injured it last year when moving, table fell on her clavicle) and pain has progressively gotten worse along with some edema.  It is tender to the touch and she feels she is in chronic pain.  She restricts lifting as it worsens.     .    History of Present Illness   C/o right shoulder pain for about 10 months associated with pain into collar bone. Worse over the last 4-5 months.   Occasional pain down down. No Numbness or Tingling    Answers for HPI/ROS submitted by the patient on 3/28/2023  Please describe your symptoms.: For about the last 6-7 months, I have had recurring shoulder/collarbone pain. Unable to lift arm most of the time. This has progressively gotten worse over time and now interferes with my sleep and everyday activity.  Have you had these symptoms before?: Yes  How long have you been having these symptoms?: Greater than 2 weeks  Please list any medications you are currently taking for this condition.: None  Please describe any probable cause for these symptoms. : Possible injury, but not sure.  What is the primary reason for your visit?: Other      The following portions of the patient's history were reviewed and updated as appropriate: allergies, current medications, past family history, past medical history, past social history, past surgical history and problem list.    Review of Systems    Objective   Vitals:    03/30/23 1600   BP: 102/72   Pulse: 72   Temp: 97.1 °F (36.2 °C)   SpO2: 96%     Body mass index is 27.86 kg/m².  Physical Exam  Vitals and nursing note reviewed.   Constitutional:       Appearance: Normal appearance. She is well-developed.   Cardiovascular:      Rate and Rhythm: Normal rate and regular rhythm.      Heart sounds: Normal heart sounds.   Pulmonary:      Effort: Pulmonary effort is normal.      Breath sounds:  Normal breath sounds.   Musculoskeletal:      Right shoulder: Tenderness present.   Skin:     General: Skin is warm and dry.   Neurological:      Mental Status: She is alert and oriented to person, place, and time.   Psychiatric:         Speech: Speech normal.         Behavior: Behavior normal.         Thought Content: Thought content normal.         Assessment & Plan   Diagnoses and all orders for this visit:    1. Pain of right clavicle (Primary)  -     XR shoulder 2+ vw right; Future  -     XR clavicle right; Future      Will start with an x-ray. Patient will need a referral to ortho or sports medicine

## 2023-03-31 DIAGNOSIS — M77.8 SHOULDER TENDONITIS, RIGHT: Primary | ICD-10-CM

## 2023-04-04 ENCOUNTER — OFFICE VISIT (OUTPATIENT)
Dept: SPORTS MEDICINE | Facility: CLINIC | Age: 53
End: 2023-04-04
Payer: COMMERCIAL

## 2023-04-04 VITALS
OXYGEN SATURATION: 98 % | TEMPERATURE: 96.7 F | HEIGHT: 65 IN | WEIGHT: 169.4 LBS | SYSTOLIC BLOOD PRESSURE: 116 MMHG | DIASTOLIC BLOOD PRESSURE: 72 MMHG | HEART RATE: 83 BPM | BODY MASS INDEX: 28.22 KG/M2

## 2023-04-04 DIAGNOSIS — M54.2 CERVICALGIA: ICD-10-CM

## 2023-04-04 DIAGNOSIS — M25.511 CHRONIC RIGHT SHOULDER PAIN: Primary | ICD-10-CM

## 2023-04-04 DIAGNOSIS — G89.29 CHRONIC RIGHT SHOULDER PAIN: Primary | ICD-10-CM

## 2023-04-04 DIAGNOSIS — M75.41 SHOULDER IMPINGEMENT SYNDROME, RIGHT: ICD-10-CM

## 2023-04-04 RX ORDER — PREDNISONE 10 MG/1
TABLET ORAL
Qty: 48 TABLET | Refills: 0 | Status: SHIPPED | OUTPATIENT
Start: 2023-04-04

## 2023-04-04 NOTE — PROGRESS NOTES
"Maranda is a 52 y.o. year old female    Chief Complaint   Patient presents with   • Right Shoulder - Pain       History of Present Illness   HPI   Patient referred to us by her PCP ARIE Vasquez for right shoulder pain.For the past several years patient has been having right-sided superior medial scapular pain with some radiation to the anterior neck and clavicle region but also in the right shoulder itself.  No known trauma.  For the past years she has noted increased pain in these areas.  Occasionally the shoulder girdle/neck pain can radiate pain down into her arm and hand but no paresthesias or tingling.  She has not noted any weakness.  Patient is left-hand dominant.  Pain is worse with reaching behind her, overhead or sleeping on her right side.  Patient has also noted a prominence over the right SC joint.    Review of Systems   Constitutional: Negative for fever.   Musculoskeletal:        Per HPI   Skin: Negative for wound.   Neurological: Negative for numbness.   All other systems reviewed and are negative.      /72 (BP Location: Right arm, Patient Position: Sitting, Cuff Size: Adult)   Pulse 83   Temp 96.7 °F (35.9 °C) (Temporal)   Ht 165.1 cm (65\")   Wt 76.8 kg (169 lb 6.4 oz)   SpO2 98%   BMI 28.19 kg/m²          Physical Exam  Vitals reviewed.   Constitutional:       Appearance: She is well-developed.   HENT:      Head: Normocephalic and atraumatic.   Eyes:      Conjunctiva/sclera: Conjunctivae normal.      Pupils: Pupils are equal, round, and reactive to light.   Cardiovascular:      Comments: No peripheral edema  Pulmonary:      Effort: Pulmonary effort is normal.   Musculoskeletal:      Comments: There is prominence of the right SC joint compared to the left.  This is nontender to palpation.    Patient does have pain with radiation to the superior medial scapula with Spurling test.  Motor 5 out of 5 upper extremities neurovascular intact.      Right shoulder normal in general " appearance.  Patient has a positive Neer and [place] and positive empty can.  Negative drop arm.  Negative [place].  Negative Speed and Yergason's.  Equivocal liftoff.     Skin:     General: Skin is warm and dry.   Neurological:      Mental Status: She is alert and oriented to person, place, and time.   Psychiatric:         Behavior: Behavior normal.       XR Clavicle Right (03/30/2023 16:49)-reviewed images, the right SC joint compared to the left does show some evidence of arthritis.  XR Shoulder 2+ View Right (03/30/2023 16:48)-reviewed images and agree with impression.    Cervical Spine X-Ray    Indication: Pain  Views: AP and Lateral    Findings:  No fracture  No bony lesions  Soft tissues normal  Mild degenerative changes at C5-6, particularly posteriorly     No prior studies are available for comparison.        Current Outpatient Medications:   •  albuterol sulfate  (90 Base) MCG/ACT inhaler, Inhale 2 puffs Every 4 (Four) Hours As Needed for Wheezing., Disp: 8 g, Rfl: 6  •  cetirizine (zyrTEC) 10 MG tablet, Take 1 tablet by mouth Daily., Disp: , Rfl:   •  levocetirizine (XYZAL) 5 MG tablet, Take 1 tablet by mouth Daily., Disp: , Rfl:   •  MELATONIN PO, Take  by mouth Daily., Disp: , Rfl:   •  mometasone (NASONEX) 50 MCG/ACT nasal spray, , Disp: , Rfl:   •  predniSONE (DELTASONE) 10 MG tablet, 6 tabs qd x 3 d, 4 tabs qd x 3 d, 3 tabs qd x 3 d, 2 tabs qd x 3 d,1 tab qd x 3 d, Disp: 48 tablet, Rfl: 0     Diagnoses and all orders for this visit:    Chronic right shoulder pain  -     XR Spine Cervical 2 or 3 View  -     predniSONE (DELTASONE) 10 MG tablet; 6 tabs qd x 3 d, 4 tabs qd x 3 d, 3 tabs qd x 3 d, 2 tabs qd x 3 d,1 tab qd x 3 d  -     Ambulatory Referral to Physical Therapy    Cervicalgia  -     XR Spine Cervical 2 or 3 View  -     predniSONE (DELTASONE) 10 MG tablet; 6 tabs qd x 3 d, 4 tabs qd x 3 d, 3 tabs qd x 3 d, 2 tabs qd x 3 d,1 tab qd x 3 d  -     Ambulatory Referral to Physical  Therapy    Shoulder impingement syndrome, right  -     XR Spine Cervical 2 or 3 View  -     predniSONE (DELTASONE) 10 MG tablet; 6 tabs qd x 3 d, 4 tabs qd x 3 d, 3 tabs qd x 3 d, 2 tabs qd x 3 d,1 tab qd x 3 d  -     Ambulatory Referral to Physical Therapy       I feel has a few different things going on.  There does appear to be arthritis changes of the right SC joint, her intermittent symptoms radiating to her superior medial scapula and shooting down her arm are most likely cervicogenic in nature, right shoulder certainly has symptoms of impingement.  For now we will treat with tapering prednisone and start physical therapy, suggest follow-up after 6 visits or so of physical therapy for reevaluation to see if injection in the shoulder would be needed.  Certainly if she is not improved an MRI of the cervical spine/shoulder could be performed in the future.      EMR Dragon/Transcription disclaimer:    Much of this encounter note is an electronic transcription/translation of spoken language to printed text.  The electronic translation of spoken language may permit erroneous, or at times, nonsensical words or phrases to be inadvertently transcribed.  Although I have reviewed the note for such errors some may still exist.

## 2023-04-05 ENCOUNTER — TELEPHONE (OUTPATIENT)
Dept: SPORTS MEDICINE | Facility: CLINIC | Age: 53
End: 2023-04-05
Payer: COMMERCIAL

## 2023-04-05 NOTE — TELEPHONE ENCOUNTER
Patient called and wanted clarification on the dosing of the Prednisone she was prescribed yesterday. I went over how to take the medication with her and she verbally understood.     -TRE Concepcion

## 2023-04-06 ENCOUNTER — PATIENT ROUNDING (BHMG ONLY) (OUTPATIENT)
Dept: SPORTS MEDICINE | Facility: CLINIC | Age: 53
End: 2023-04-06
Payer: COMMERCIAL

## 2023-04-06 NOTE — PROGRESS NOTES
April 6, 2023    A [employer] Message has been sent to the patient [place] [facility] [place]

## 2023-08-03 ENCOUNTER — OFFICE VISIT (OUTPATIENT)
Dept: INTERNAL MEDICINE | Facility: CLINIC | Age: 53
End: 2023-08-03
Payer: COMMERCIAL

## 2023-08-03 VITALS
DIASTOLIC BLOOD PRESSURE: 68 MMHG | SYSTOLIC BLOOD PRESSURE: 108 MMHG | WEIGHT: 178 LBS | HEART RATE: 71 BPM | OXYGEN SATURATION: 98 % | BODY MASS INDEX: 29.66 KG/M2 | HEIGHT: 65 IN | TEMPERATURE: 97.8 F

## 2023-08-03 DIAGNOSIS — R53.83 OTHER FATIGUE: ICD-10-CM

## 2023-08-03 DIAGNOSIS — Z12.31 ENCOUNTER FOR SCREENING MAMMOGRAM FOR MALIGNANT NEOPLASM OF BREAST: ICD-10-CM

## 2023-08-03 DIAGNOSIS — R55 SYNCOPE, UNSPECIFIED SYNCOPE TYPE: Primary | ICD-10-CM

## 2023-08-03 DIAGNOSIS — I44.0 1ST DEGREE AV BLOCK: ICD-10-CM

## 2023-08-03 DIAGNOSIS — R06.83 SNORING: ICD-10-CM

## 2023-08-03 PROBLEM — N18.30 STAGE 3 CHRONIC KIDNEY DISEASE: Status: ACTIVE | Noted: 2023-03-02

## 2023-08-03 PROBLEM — N17.9 ACUTE KIDNEY INJURY: Status: ACTIVE | Noted: 2023-03-02

## 2023-08-03 PROBLEM — Q63.0 ACCESSORY KIDNEY: Status: ACTIVE | Noted: 2023-03-06

## 2023-08-25 ENCOUNTER — HOSPITAL ENCOUNTER (OUTPATIENT)
Dept: MAMMOGRAPHY | Facility: HOSPITAL | Age: 53
Discharge: HOME | End: 2023-08-25
Admitting: NURSE PRACTITIONER
Payer: COMMERCIAL

## 2023-08-25 DIAGNOSIS — Z12.31 ENCOUNTER FOR SCREENING MAMMOGRAM FOR MALIGNANT NEOPLASM OF BREAST: ICD-10-CM

## 2023-08-25 PROCEDURE — 77063 BREAST TOMOSYNTHESIS BI: CPT

## 2023-08-25 PROCEDURE — 77067 SCR MAMMO BI INCL CAD: CPT

## 2023-09-05 ENCOUNTER — OFFICE VISIT (OUTPATIENT)
Dept: CARDIOLOGY | Facility: CLINIC | Age: 53
End: 2023-09-05
Payer: COMMERCIAL

## 2023-09-05 VITALS
DIASTOLIC BLOOD PRESSURE: 80 MMHG | OXYGEN SATURATION: 98 % | SYSTOLIC BLOOD PRESSURE: 102 MMHG | HEART RATE: 77 BPM | HEIGHT: 65 IN | WEIGHT: 174 LBS | BODY MASS INDEX: 28.99 KG/M2

## 2023-09-05 DIAGNOSIS — R55 SYNCOPE AND COLLAPSE: Primary | ICD-10-CM

## 2023-09-05 PROCEDURE — 93000 ELECTROCARDIOGRAM COMPLETE: CPT | Performed by: STUDENT IN AN ORGANIZED HEALTH CARE EDUCATION/TRAINING PROGRAM

## 2023-09-05 PROCEDURE — 99204 OFFICE O/P NEW MOD 45 MIN: CPT | Performed by: STUDENT IN AN ORGANIZED HEALTH CARE EDUCATION/TRAINING PROGRAM

## 2023-09-05 NOTE — PROGRESS NOTES
Subjective:     Encounter Date:09/05/2023      Patient ID: Maranda Gillis is a 53 y.o. female.    Chief Complaint:  syncope    HPI:   53 y.o. female with no significant past medical history who presents for initial evaluation of syncope.  In late July, patient was visiting her daughter in [place].  She woke up in the middle the night at about 2 AM to get water.  She was walking back to her bedroom and exiting she knew she woke up on the floor.  She woke up with her son-in-law and her daughter standing over her.  Her daughter got her up and tried to walk her over to the bedroom patient had another syncopal event.  Her daughter caught her at that time.  When she woke up she was diaphoretic and pale.  She was apparently unconscious for 20 to 30 seconds during both episodes.  At that time she was taken to the ED where EKG revealed sinus rhythm with first-degree AV delay.  Other tests and blood work were normal however she was diagnosed with a UTI and started on antibiotics.  Since then, she has not had any other further episodes of syncope.  She has felt intermittent lightheadedness which she notes is more frequent than prior.  She denies any palpitations.      The following portions of the patient's history were reviewed and updated as appropriate: allergies, current medications, past family history, past medical history, past social history, past surgical history and problem list.     REVIEW OF SYSTEMS:   All systems reviewed.  Pertinent positives identified in HPI.  All other systems are negative.    Past Medical History:   Diagnosis Date    Allergic     Colon polyp     Migraine     with OCPS    Ovarian cyst     Vitamin D deficiency        Family History   Problem Relation Age of Onset    Diabetes Mother     Melanoma Mother     COPD Father     Emphysema Father     Asthma Sister     Breast cancer Neg Hx     Ovarian cancer Neg Hx     Colon cancer Neg Hx     Deep vein thrombosis Neg Hx     Pulmonary embolism Neg Hx         Social History     Socioeconomic History    Marital status:    Tobacco Use    Smoking status: Never    Smokeless tobacco: Never    Tobacco comments:     3-4 Cups/ Day    Vaping Use    Vaping Use: Never used   Substance and Sexual Activity    Alcohol use: Yes     Comment: SOCIALLY    Drug use: No    Sexual activity: Yes     Partners: Male     Birth control/protection: None       Allergies   Allergen Reactions    Montelukast Other (See Comments)     Hallucinations when taking    Other      ALLERGIC TO WEEDS, MOLDS, DUST MITES, GRASS, TREES, CATS    Singulair [Montelukast Sodium] Hallucinations       Past Surgical History:   Procedure Laterality Date    BUNIONECTOMY      bilateral    COLONOSCOPY N/A 10/14/2019    Procedure: COLONOSCOPY with polypectomy;  Surgeon: Claudette Liu MD;  Location: [facility];  Service: Gastroenterology    SKIN LESION EXCISION           ECG 12 Lead    Date/Time: 9/5/2023 2:37 PM  Performed by: Joseph Ambriz MD  Authorized by: Joseph Ambriz MD   Comparison: compared with previous ECG from 8/3/2023  Similar to previous ECG  Comparison to previous ECG: DE is shorter  Rhythm: sinus rhythm  Rate: normal  Conduction: conduction normal  QRS axis: normal    Clinical impression: normal ECG           Objective:         Vitals:    09/05/23 1412   BP: 102/80   Pulse: 77   SpO2: 98%       PHYSICAL EXAM:  GEN: well appearing, in NAD   HEENT: NCAT, EOMI, moist mucus membranes   Respiratory: CTAB, no wheezes, rales or rhonchi  CV: normal rate, regular rhythm, normal S1, S2, no murmurs, rubs, gallops, +2 radial pulses b/l  GI: soft, nontender, nondistended  MSK: no edema  Skin: no rash, warm, dry  Heme/Lymph: no bruising or bleeding  Neuro: Alert and Oriented x 3, grossly normal motor function        Assessment:         (R55) Syncope and collapse - Plan: Adult Transthoracic Echo Complete w/ Color, Spectral and Contrast if Necessary Per Protocol, Holter Monitor - 72 Hour Up To 15 Days    53 y.o.  female with no significant past medical history who presents for initial evaluation of syncope.       Plan:       #Syncope  Unclear etiology. EKG is normal. Physical exam is normal. Will obtain echocardiogram for baseline assessment. Will also get 2 week holter to assess for any arrhythmia.    Ani Vazquez, thank you very much for referring this kind patient to me. Please call me with any questions or concerns. I will see the patient again in the office pending test results         Joseph Ambriz MD  09/05/23  [facility]    Outpatient Encounter Medications as of 9/5/2023   Medication Sig Dispense Refill    albuterol sulfate  (90 Base) MCG/ACT inhaler Inhale 2 puffs Every 4 (Four) Hours As Needed for Wheezing. 8 g 6    levocetirizine (XYZAL) 5 MG tablet Take 1 tablet by mouth Daily.      mometasone (NASONEX) 50 MCG/ACT nasal spray        No facility-administered encounter medications on file as of 9/5/2023.

## 2023-09-21 ENCOUNTER — HOSPITAL ENCOUNTER (OUTPATIENT)
Dept: CARDIOLOGY | Facility: HOSPITAL | Age: 53
Discharge: HOME | End: 2023-09-21
Admitting: STUDENT IN AN ORGANIZED HEALTH CARE EDUCATION/TRAINING PROGRAM
Payer: COMMERCIAL

## 2023-09-21 ENCOUNTER — TELEPHONE (OUTPATIENT)
Dept: CARDIOLOGY | Facility: CLINIC | Age: 53
End: 2023-09-21
Payer: COMMERCIAL

## 2023-09-21 VITALS
DIASTOLIC BLOOD PRESSURE: 78 MMHG | OXYGEN SATURATION: 99 % | WEIGHT: 174 LBS | SYSTOLIC BLOOD PRESSURE: 110 MMHG | BODY MASS INDEX: 28.99 KG/M2 | HEIGHT: 65 IN | HEART RATE: 70 BPM

## 2023-09-21 DIAGNOSIS — R55 SYNCOPE AND COLLAPSE: ICD-10-CM

## 2023-09-21 LAB
AORTIC ARCH: 2.2 CM
ASCENDING AORTA: 2.5 CM
BH CV ECHO MEAS - ACS: 1.71 CM
BH CV ECHO MEAS - AO MAX PG: 5.6 MMHG
BH CV ECHO MEAS - AO MEAN PG: 3 MMHG
BH CV ECHO MEAS - AO ROOT DIAM: 2.9 CM
BH CV ECHO MEAS - AO V2 MAX: 118 CM/SEC
BH CV ECHO MEAS - AO V2 VTI: 26.6 CM
BH CV ECHO MEAS - AVA(I,D): 2.21 CM2
BH CV ECHO MEAS - EDV(CUBED): 74.1 ML
BH CV ECHO MEAS - EDV(MOD-SP2): 36 ML
BH CV ECHO MEAS - EDV(MOD-SP4): 55 ML
BH CV ECHO MEAS - EF(MOD-BP): 64.5 %
BH CV ECHO MEAS - EF(MOD-SP2): 66.7 %
BH CV ECHO MEAS - EF(MOD-SP4): 63.6 %
BH CV ECHO MEAS - ESV(CUBED): 17.9 ML
BH CV ECHO MEAS - ESV(MOD-SP2): 12 ML
BH CV ECHO MEAS - ESV(MOD-SP4): 20 ML
BH CV ECHO MEAS - FS: 37.8 %
BH CV ECHO MEAS - IVS/LVPW: 0.89 CM
BH CV ECHO MEAS - IVSD: 0.8 CM
BH CV ECHO MEAS - LAT PEAK E' VEL: 8.1 CM/SEC
BH CV ECHO MEAS - LV DIASTOLIC VOL/BSA (35-75): 29.5 CM2
BH CV ECHO MEAS - LV MASS(C)D: 109.8 GRAMS
BH CV ECHO MEAS - LV MAX PG: 3.4 MMHG
BH CV ECHO MEAS - LV MEAN PG: 2 MMHG
BH CV ECHO MEAS - LV SYSTOLIC VOL/BSA (12-30): 10.7 CM2
BH CV ECHO MEAS - LV V1 MAX: 92.4 CM/SEC
BH CV ECHO MEAS - LV V1 VTI: 20 CM
BH CV ECHO MEAS - LVIDD: 4.2 CM
BH CV ECHO MEAS - LVIDS: 2.6 CM
BH CV ECHO MEAS - LVOT AREA: 2.9 CM2
BH CV ECHO MEAS - LVOT DIAM: 1.93 CM
BH CV ECHO MEAS - LVPWD: 0.9 CM
BH CV ECHO MEAS - MED PEAK E' VEL: 8.2 CM/SEC
BH CV ECHO MEAS - MV A DUR: 0.12 SEC
BH CV ECHO MEAS - MV A MAX VEL: 104 CM/SEC
BH CV ECHO MEAS - MV DEC SLOPE: 506.4 CM/SEC2
BH CV ECHO MEAS - MV DEC TIME: 0.19 SEC
BH CV ECHO MEAS - MV E MAX VEL: 73.3 CM/SEC
BH CV ECHO MEAS - MV E/A: 0.7
BH CV ECHO MEAS - MV MAX PG: 4.3 MMHG
BH CV ECHO MEAS - MV MEAN PG: 2.02 MMHG
BH CV ECHO MEAS - MV P1/2T: 52.6 MSEC
BH CV ECHO MEAS - MV V2 VTI: 25.3 CM
BH CV ECHO MEAS - MVA(P1/2T): 4.2 CM2
BH CV ECHO MEAS - MVA(VTI): 2.32 CM2
BH CV ECHO MEAS - PA ACC TIME: 0.09 SEC
BH CV ECHO MEAS - PA V2 MAX: 73.3 CM/SEC
BH CV ECHO MEAS - PULM A REVS DUR: 0.12 SEC
BH CV ECHO MEAS - PULM A REVS VEL: 34.3 CM/SEC
BH CV ECHO MEAS - PULM DIAS VEL: 43.3 CM/SEC
BH CV ECHO MEAS - PULM S/D: 1.17
BH CV ECHO MEAS - PULM SYS VEL: 50.6 CM/SEC
BH CV ECHO MEAS - QP/QS: 1.09
BH CV ECHO MEAS - RAP SYSTOLE: 8 MMHG
BH CV ECHO MEAS - RV MAX PG: 1.86 MMHG
BH CV ECHO MEAS - RV V1 MAX: 68.1 CM/SEC
BH CV ECHO MEAS - RV V1 VTI: 15.2 CM
BH CV ECHO MEAS - RVOT DIAM: 2.32 CM
BH CV ECHO MEAS - RVSP: 25.3 MMHG
BH CV ECHO MEAS - SI(MOD-SP2): 12.9 ML/M2
BH CV ECHO MEAS - SI(MOD-SP4): 18.8 ML/M2
BH CV ECHO MEAS - SUP REN AO DIAM: 1.8 CM
BH CV ECHO MEAS - SV(LVOT): 58.7 ML
BH CV ECHO MEAS - SV(MOD-SP2): 24 ML
BH CV ECHO MEAS - SV(MOD-SP4): 35 ML
BH CV ECHO MEAS - SV(RVOT): 64.1 ML
BH CV ECHO MEAS - TAPSE (>1.6): 2.15 CM
BH CV ECHO MEAS - TR MAX PG: 17.3 MMHG
BH CV ECHO MEAS - TR MAX VEL: 207.7 CM/SEC
BH CV ECHO MEASUREMENTS AVERAGE E/E' RATIO: 8.99
BH CV VAS BP LEFT ARM: NORMAL MMHG
BH CV XLRA - RV BASE: 2.45 CM
BH CV XLRA - RV LENGTH: 5.8 CM
BH CV XLRA - RV MID: 2.7 CM
BH CV XLRA - TDI S': 10.7 CM/SEC
LEFT ATRIUM VOLUME INDEX: 14 ML/M2
SINUS: 2.8 CM
STJ: 2.49 CM

## 2023-09-21 PROCEDURE — 93306 TTE W/DOPPLER COMPLETE: CPT

## 2023-09-21 NOTE — TELEPHONE ENCOUNTER
----- Message from ARIE Sawant sent at 9/21/2023  9:22 AM EDT -----  Please let patient know that her echocardiogram looks good.

## 2023-09-21 NOTE — TELEPHONE ENCOUNTER
Called patient twice and both times she could not hear me. Will try again later.     Nabila Ruff RN  Triage [facility]

## 2023-09-22 NOTE — TELEPHONE ENCOUNTER
Left VM of results and to call back with any further questions or concerns, allowed by verbal release form.     Nabila Ruff RN  Triage [facility]

## 2023-10-18 ENCOUNTER — OFFICE VISIT (OUTPATIENT)
Dept: SLEEP MEDICINE | Facility: HOSPITAL | Age: 53
End: 2023-10-18
Payer: COMMERCIAL

## 2023-10-18 VITALS
HEIGHT: 65 IN | SYSTOLIC BLOOD PRESSURE: 138 MMHG | DIASTOLIC BLOOD PRESSURE: 75 MMHG | OXYGEN SATURATION: 98 % | BODY MASS INDEX: 29.39 KG/M2 | HEART RATE: 76 BPM | WEIGHT: 176.4 LBS

## 2023-10-18 DIAGNOSIS — G47.19 EXCESSIVE DAYTIME SLEEPINESS: ICD-10-CM

## 2023-10-18 DIAGNOSIS — E66.3 OVERWEIGHT (BMI 25.0-29.9): ICD-10-CM

## 2023-10-18 DIAGNOSIS — R06.81 WITNESSED EPISODE OF APNEA: Primary | ICD-10-CM

## 2023-10-18 PROCEDURE — G0463 HOSPITAL OUTPT CLINIC VISIT: HCPCS

## 2023-10-18 NOTE — PROGRESS NOTES
[facility]  [address]  [place] [address]  Phone   Fax       Maranda Gillis  [id number]   1970  53 y.o.  female      Referring Provider and PCP: Ani Vazquez APRN    Type of service: Initial Sleep Medicine Consult.  Date of service: 10/18/2023            CHIEF COMPLAINT: Suspected sleep apnea      HISTORY OF PRESENT ILLNESS:  Thank you for asking us to see Maranda Gillis.  Maranda Gillis 53 y.o. was seen today on 10/18/2023 at [facility].  Patient presents today with symptoms of snoring, non-restorative sleep, and witnessed apneas.  The symptoms are chronic and persistent in nature.  Patient has no history of tonsillectomy, adenoidectomy, nasal surgery, UPPP.  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  Not dozing off at work but could nap if given opportunity.  Not napping on weekends, feels better if gets 8-9 hours of sleep.  No hx dozing off while driving.  Sleeping better since cutting back on alcohol, previously woke up a lot with drinking.  She has had a lot of daytime fatigue for the last few years since she had a bad divorce, she thinks stress likely contributing.  She is also about 15 pounds up from her normal weight which she feels might be contributing as well and she recently resumed exercising again.        SLEEP HISTORY:  Sleep schedule:  Bedtime: 10 to 11 PM  Wake time: 530 to 7:30 AM  Normally takes 5 minutes or less to fall asleep  Average hours of sleep: 7-9  Number of naps per day: 0    Symptoms:   In addition to the above, patient reports the following associated symptoms:  Have you ever awakened gasping for breath, coughing, choking: No   Change in weight:  Yes, gained 10 pounds  Morning headaches:  Yes   Awaken with a sore throat or dry mouth:  No   Leg jerking at night:  No   Creepy crawly feeling in legs/urge to move legs: No   Teeth grinding: Yes   Have you ever awakened at night with a sour taste or burning sensation in  "your chest:  No   Do you have muscle weakness with laughing or anger:  No   Have you ever felt paralyzed while going to sleep or waking up:  No   Sleepwalking: No   Nightmares: No   Nocturia (urination at night): 1 times per night  Memory Problem: No     Medical Conditions (PMH):   Low blood pressure  First-degree AV block    Social history:  Do you drive a commercial vehicle:  No   Shift work:  No   Tobacco use:  No   Alcohol use: 3 per week  Caffeinated drinks: 3 per day    Family History (parents and siblings) (pertaining to sleep medicine):  COPD  Asthma  Melanoma  Diabetes    Medications: reviewed    Allergies:  Montelukast, Other, and Singulair [montelukast sodium]      REVIEW OF SYSTEMS:  Pertinent positive symptoms are:  Snoring  Witnessed apnea  [place] Sleepiness Scale of Total score: 17   Fatigue  Joint pain  Nasal congestion  Syncope  Depression          PHYSICAL EXAM:  CONSTITUTINONAL:   Vitals:    10/18/23 1300   BP: 138/75   Pulse: 76   SpO2: 98%   Weight: 80 kg (176 lb 6.4 oz)   Height: 165.1 cm (65\")    Body mass index is 29.35 kg/m².   HEAD: atraumatic, normocephalic   THROAT: tonsils are not enlarged, Mallampati class III  NECK: Neck Circumference: 14 inches, trachea is midline  RESPIRATORY SYSTEM: Respirations even, unlabored, normal rate  CARDIOVASULAR SYSTEM: Normal rate, no edema   NEUROLOGICAL SYSTEM: Alert and oriented x 3, normal gait  PSYCHIATRIC SYSTEM: Mood is normal/ appropriate     Office note(s) from care team reviewed. Office note(s) reviewed: 8/3/2023 PCP note, 9/5/2023 cardiology note.    Labs/ Test Results Reviewed:      Reviewed 9/2023 echo, 9/2023 monitor study            ASSESSMENT AND PLAN:   Witnessed apnea (R06.81): patient's symptoms and physical examination are concerning for possible sleep apnea (G47.30).   I discussed the signs, symptoms, and pathophysiology of sleep apnea with this patient.  I also discussed the potential complications of untreated sleep apnea including " but not limited to resistant hypertension, insulin resistance, pulmonary hypertension, atrial fibrillation, stroke, nonrestorative sleep with hypersomnia which can increase risk for motor vehicle accidents, etc.   Different testing methods including home-based and lab based sleep studies were discussed with this patient.   Based on patient history and physical examination, will proceed with home sleep study.  The order for the sleep study is placed in [facility].  The test will be scheduled after prior authorization has been obtained through patient's insurance.  Treatment and management will be discussed in more detail with this patient after the test is completed.  All questions were answered to patient's satisfaction.   Snoring (R06.83): snoring is the sound created by turbulent airflow vibrating upper airway soft tissue.  I have also discussed factors affecting snoring including sleep deprivation, sleeping on the back and alcohol ingestion. To minimize snoring, patient is advised to have adequate sleep, sleep on their side, and avoid alcohol and sedative medications around bedtime.   Excessive daytime sleepiness: [place] Sleepiness Scale of Total score: 17.  She is aware not to do any drowsy or tired driving.  She does not have excessive daytime fatigue if she gets 8 to 9 hours of sleep at night.  Not currently napping.  Has been under increased stress and had increased weight.  She recently got back into exercising.  Recommended considering counseling to help with stress, discussed with PCP as well, continue with healthy lifestyle.  Rule out sleep apnea as above.  If symptoms persist and no evidence of sleep apnea on home study she will follow-up at which point may need consider additional testing.  Patient verbalized understanding of plan.  Overweight: Body mass index is 29.35 kg/m².. Patients who are overweight or obese are at increased risk of sleep apnea/ sleep disordered breathing. Weight reduction and healthy  lifestyle are encouraged in overweight/ obese patients as part of a comprehensive approach to sleep apnea treatment.    History of syncope in the setting of UTI  First-degree AV block    I have also discussed with the patient the following  Sleep hygiene: try to maintain a regular bed time and wake time, avoid watching TV/ using electronic devices in bed (including cell phones), limit caffeinated and alcoholic beverages before bed, try to maintain a cool and quiet sleep environment, avoid daytime naps  Adequate amount of sleep: most people need around 7 to 8 hours of sleep each night      Patient will follow-up after study, 31 to 90 days after PAP therapy initiated if applicable, or contact the office sooner for questions or concerns. Patient's questions were answered.            Thank you again for asking me to consult on this patient.  Please do not hesitate to call me if you have additional questions or concerns.       Thu Corbin DNP, APRN  [facility]

## 2023-10-18 NOTE — LETTER
October 18, 2023     ARIE Vasquez  [address]  [address]  [place] [id number]    Patient: Maranda Gillis   YOB: 1970   Date of Visit: 10/18/2023     Dear ARIE Vasquez:       Thank you for referring Maranda Gillis to me for evaluation. Below are the relevant portions of my assessment and plan of care.    If you have questions, please do not hesitate to call me. I look forward to following Maranda along with you.         Sincerely,        Brooke Corbin DNP, APRN        CC: No Recipients    Brooke Corbin DNP, APRN  10/18/23 1414  Sign when Signing Visit    [facility]  [address]  [address]  [place] [address]  Phone   Fax       Maranda Gillis  [id number]   1970  53 y.o.  female      Referring Provider and PCP: Ani Vazquez APRN    Type of service: Initial Sleep Medicine Consult.  Date of service: 10/18/2023            CHIEF COMPLAINT: Suspected sleep apnea      HISTORY OF PRESENT ILLNESS:  Thank you for asking us to see Maranda Gillis.  Maranda Gillis 53 y.o. was seen today on 10/18/2023 at [facility].  Patient presents today with symptoms of snoring, non-restorative sleep, and witnessed apneas.  The symptoms are chronic and persistent in nature.  Patient has no history of tonsillectomy, adenoidectomy, nasal surgery, UPPP.  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  Not dozing off at work but could nap if given opportunity.  Not napping on weekends, feels better if gets 8-9 hours of sleep.  No hx dozing off while driving.  Sleeping better since cutting back on alcohol, previously woke up a lot with drinking.  She has had a lot of daytime fatigue for the last few years since she had a bad divorce, she thinks stress likely contributing.  She is also about 15 pounds up from her normal weight which she feels might be contributing as well and she recently resumed exercising again.        SLEEP HISTORY:  Sleep schedule:  Bedtime: 10  "to 11 PM  Wake time: 530 to 7:30 AM  Normally takes 5 minutes or less to fall asleep  Average hours of sleep: 7-9  Number of naps per day: 0    Symptoms:   In addition to the above, patient reports the following associated symptoms:  Have you ever awakened gasping for breath, coughing, choking: No   Change in weight:  Yes, gained 10 pounds  Morning headaches:  Yes   Awaken with a sore throat or dry mouth:  No   Leg jerking at night:  No   Creepy crawly feeling in legs/urge to move legs: No   Teeth grinding: Yes   Have you ever awakened at night with a sour taste or burning sensation in your chest:  No   Do you have muscle weakness with laughing or anger:  No   Have you ever felt paralyzed while going to sleep or waking up:  No   Sleepwalking: No   Nightmares: No   Nocturia (urination at night): 1 times per night  Memory Problem: No     Medical Conditions (PMH):   Low blood pressure  First-degree AV block    Social history:  Do you drive a commercial vehicle:  No   Shift work:  No   Tobacco use:  No   Alcohol use: 3 per week  Caffeinated drinks: 3 per day    Family History (parents and siblings) (pertaining to sleep medicine):  COPD  Asthma  Melanoma  Diabetes    Medications: reviewed    Allergies:  Montelukast, Other, and Singulair [montelukast sodium]      REVIEW OF SYSTEMS:  Pertinent positive symptoms are:  Snoring  Witnessed apnea  [place] Sleepiness Scale of Total score: 17   Fatigue  Joint pain  Nasal congestion  Syncope  Depression          PHYSICAL EXAM:  CONSTITUTINONAL:   Vitals:    10/18/23 1300   BP: 138/75   Pulse: 76   SpO2: 98%   Weight: 80 kg (176 lb 6.4 oz)   Height: 165.1 cm (65\")    Body mass index is 29.35 kg/m².   HEAD: atraumatic, normocephalic   THROAT: tonsils are not enlarged, Mallampati class III  NECK: Neck Circumference: 14 inches, trachea is midline  RESPIRATORY SYSTEM: Respirations even, unlabored, normal rate  CARDIOVASULAR SYSTEM: Normal rate, no edema   NEUROLOGICAL SYSTEM: Alert and " oriented x 3, normal gait  PSYCHIATRIC SYSTEM: Mood is normal/ appropriate     Office note(s) from care team reviewed. Office note(s) reviewed: 8/3/2023 PCP note, 9/5/2023 cardiology note.    Labs/ Test Results Reviewed:      Reviewed 9/2023 echo, 9/2023 monitor study            ASSESSMENT AND PLAN:   Witnessed apnea (R06.81): patient's symptoms and physical examination are concerning for possible sleep apnea (G47.30).   I discussed the signs, symptoms, and pathophysiology of sleep apnea with this patient.  I also discussed the potential complications of untreated sleep apnea including but not limited to resistant hypertension, insulin resistance, pulmonary hypertension, atrial fibrillation, stroke, nonrestorative sleep with hypersomnia which can increase risk for motor vehicle accidents, etc.   Different testing methods including home-based and lab based sleep studies were discussed with this patient.   Based on patient history and physical examination, will proceed with home sleep study.  The order for the sleep study is placed in [facility].  The test will be scheduled after prior authorization has been obtained through patient's insurance.  Treatment and management will be discussed in more detail with this patient after the test is completed.  All questions were answered to patient's satisfaction.   Snoring (R06.83): snoring is the sound created by turbulent airflow vibrating upper airway soft tissue.  I have also discussed factors affecting snoring including sleep deprivation, sleeping on the back and alcohol ingestion. To minimize snoring, patient is advised to have adequate sleep, sleep on their side, and avoid alcohol and sedative medications around bedtime.   Excessive daytime sleepiness: [place] Sleepiness Scale of Total score: 17.  She is aware not to do any drowsy or tired driving.  She does not have excessive daytime fatigue if she gets 8 to 9 hours of sleep at night.  Not currently napping.  Has been under  increased stress and had increased weight.  She recently got back into exercising.  Recommended considering counseling to help with stress, discussed with PCP as well, continue with healthy lifestyle.  Rule out sleep apnea as above.  If symptoms persist and no evidence of sleep apnea on home study she will follow-up at which point may need consider additional testing.  Patient verbalized understanding of plan.  Overweight: Body mass index is 29.35 kg/m².. Patients who are overweight or obese are at increased risk of sleep apnea/ sleep disordered breathing. Weight reduction and healthy lifestyle are encouraged in overweight/ obese patients as part of a comprehensive approach to sleep apnea treatment.    History of syncope in the setting of UTI  First-degree AV block    I have also discussed with the patient the following  Sleep hygiene: try to maintain a regular bed time and wake time, avoid watching TV/ using electronic devices in bed (including cell phones), limit caffeinated and alcoholic beverages before bed, try to maintain a cool and quiet sleep environment, avoid daytime naps  Adequate amount of sleep: most people need around 7 to 8 hours of sleep each night      Patient will follow-up after study, 31 to 90 days after PAP therapy initiated if applicable, or contact the office sooner for questions or concerns. Patient's questions were answered.            Thank you again for asking me to consult on this patient.  Please do not hesitate to call me if you have additional questions or concerns.       Thu Corbin DNP, ARIE  [facility]

## 2023-11-01 ENCOUNTER — HOSPITAL ENCOUNTER (OUTPATIENT)
Dept: SLEEP MEDICINE | Facility: HOSPITAL | Age: 53
End: 2023-11-01
Payer: COMMERCIAL

## 2023-11-01 DIAGNOSIS — E66.3 OVERWEIGHT (BMI 25.0-29.9): ICD-10-CM

## 2023-11-01 DIAGNOSIS — G47.19 EXCESSIVE DAYTIME SLEEPINESS: ICD-10-CM

## 2023-11-01 DIAGNOSIS — R06.81 WITNESSED EPISODE OF APNEA: ICD-10-CM

## 2023-11-01 PROCEDURE — 95806 SLEEP STUDY UNATT&RESP EFFT: CPT

## 2023-11-08 DIAGNOSIS — G47.33 OSA (OBSTRUCTIVE SLEEP APNEA): Primary | ICD-10-CM

## 2023-11-08 DIAGNOSIS — R06.83 SNORING: ICD-10-CM

## 2023-11-15 ENCOUNTER — TELEPHONE (OUTPATIENT)
Dept: SLEEP MEDICINE | Facility: HOSPITAL | Age: 53
End: 2023-11-15
Payer: COMMERCIAL

## 2023-11-15 NOTE — TELEPHONE ENCOUNTER
Lv for patient to call if she has questions about sleep study results , a preferred DME or to schedule follow up. Sending orders to [facility] unless pt requests otherwise

## 2024-05-13 ENCOUNTER — OFFICE VISIT (OUTPATIENT)
Dept: INTERNAL MEDICINE | Facility: CLINIC | Age: 54
End: 2024-05-13
Payer: COMMERCIAL

## 2024-05-13 ENCOUNTER — HOSPITAL ENCOUNTER (OUTPATIENT)
Dept: GENERAL RADIOLOGY | Facility: HOSPITAL | Age: 54
Discharge: HOME | End: 2024-05-13
Admitting: NURSE PRACTITIONER
Payer: COMMERCIAL

## 2024-05-13 VITALS
HEIGHT: 65 IN | WEIGHT: 173 LBS | OXYGEN SATURATION: 98 % | SYSTOLIC BLOOD PRESSURE: 110 MMHG | HEART RATE: 64 BPM | TEMPERATURE: 98 F | BODY MASS INDEX: 28.82 KG/M2 | DIASTOLIC BLOOD PRESSURE: 80 MMHG

## 2024-05-13 DIAGNOSIS — M25.552 LEFT HIP PAIN: Primary | ICD-10-CM

## 2024-05-13 DIAGNOSIS — M16.10 ARTHRITIS OF HIP: Primary | ICD-10-CM

## 2024-05-13 DIAGNOSIS — M25.552 LEFT HIP PAIN: ICD-10-CM

## 2024-05-13 PROCEDURE — 99213 OFFICE O/P EST LOW 20 MIN: CPT | Performed by: NURSE PRACTITIONER

## 2024-05-13 PROCEDURE — 73502 X-RAY EXAM HIP UNI 2-3 VIEWS: CPT

## 2024-05-13 RX ORDER — CREAM BASE NO.135
CREAM (GRAM) MISCELLANEOUS
COMMUNITY
Start: 2024-02-08

## 2024-05-13 NOTE — PROGRESS NOTES
Subjective   Maranda Gillis is a 53 y.o. female. Patient is here today for   Chief Complaint   Patient presents with    Hip Pain   .    History of Present Illness   C/o left hip pain for about 1-2 weeks. She did slip and fall about 3 weeks ago and stretched her hip flexor. She has tried ibuprofen which helps some.   She does have some pain down her leg and it is getting worse. It is worse when she first stands up    The following portions of the patient's history were reviewed and updated as appropriate: allergies, current medications, past family history, past medical history, past social history, past surgical history and problem list.    Review of Systems    Objective   Vitals:    05/13/24 1115   BP: 110/80   Pulse: 64   Temp: 98 °F (36.7 °C)   SpO2: 98%     Body mass index is 28.79 kg/m².  Physical Exam  Vitals and nursing note reviewed.   Constitutional:       Appearance: Normal appearance. She is well-developed.   Cardiovascular:      Rate and Rhythm: Normal rate and regular rhythm.      Heart sounds: Normal heart sounds.   Pulmonary:      Effort: Pulmonary effort is normal.      Breath sounds: Normal breath sounds.   Musculoskeletal:        Legs:    Skin:     General: Skin is warm and dry.   Neurological:      Mental Status: She is alert and oriented to person, place, and time.   Psychiatric:         Speech: Speech normal.         Behavior: Behavior normal.         Thought Content: Thought content normal.         Assessment & Plan   Diagnoses and all orders for this visit:    1. Left hip pain (Primary)  -     XR Hip With or Without Pelvis 2 - 3 View Left; Future    Will check an x-ray today.  Will need referral to sports medicine versus Ortho

## 2024-05-20 ENCOUNTER — OFFICE VISIT (OUTPATIENT)
Dept: SPORTS MEDICINE | Facility: CLINIC | Age: 54
End: 2024-05-20
Payer: COMMERCIAL

## 2024-05-20 VITALS
TEMPERATURE: 97.3 F | SYSTOLIC BLOOD PRESSURE: 120 MMHG | DIASTOLIC BLOOD PRESSURE: 84 MMHG | HEIGHT: 65 IN | RESPIRATION RATE: 14 BRPM | BODY MASS INDEX: 28.82 KG/M2 | OXYGEN SATURATION: 97 % | WEIGHT: 173 LBS | HEART RATE: 84 BPM

## 2024-05-20 DIAGNOSIS — M89.8X8 ILIAC CREST BONE PAIN: Primary | ICD-10-CM

## 2024-05-20 PROCEDURE — 99214 OFFICE O/P EST MOD 30 MIN: CPT | Performed by: FAMILY MEDICINE

## 2024-05-20 RX ORDER — CELECOXIB 200 MG/1
200 CAPSULE ORAL DAILY
Qty: 30 CAPSULE | Refills: 0 | Status: SHIPPED | OUTPATIENT
Start: 2024-05-20

## 2024-05-20 NOTE — PROGRESS NOTES
"Maranda is a 54 y.o. year old female    L hip pain for past several weeks. Started kick boxing 1-2 months ago. Pain over past 3-4 weeks, but had a fall about 2 weeks earlier.     History of Present Illness   HPI   Patient referred to us by her PCP ARIE Vasquez for chronic left hip pain.  Patient began kickboxing classes about 1 to 2 months ago, she then began to note some discomfort over her left lateral hip as she points to the iliac crest area about 3 to 4 weeks ago, there was a fall about 2 weeks prior to noticing the pain.  In the fall patient did have a hyperextension of the left hip, she had no pain right after the fall and did not appear to start to be painful until a couple of weeks after the fall.  The pain over the lateral hip has become more constant and is worse with transitioning from seated to standing.  No radicular symptoms.  No anterior hip pain.  Patient had x-rays done which was read as bilateral mild osteoarthritis of the hips.  Patient is here for further evaluation and treatment options.  Review of Systems   Constitutional:  Negative for fever.   Musculoskeletal:         Per HPI   Skin:  Negative for wound.   Neurological:  Negative for numbness.   All other systems reviewed and are negative.      /84 (BP Location: Left arm, Patient Position: Sitting, Cuff Size: Adult)   Pulse 84   Temp 97.3 °F (36.3 °C) (Infrared)   Resp 14   Ht 165.1 cm (65\")   Wt 78.5 kg (173 lb)   SpO2 97%   BMI 28.79 kg/m²          Physical Exam  Vitals reviewed.   Constitutional:       Appearance: She is well-developed.   HENT:      Head: Normocephalic and atraumatic.   Eyes:      Conjunctiva/sclera: Conjunctivae normal.      Pupils: Pupils are equal, round, and reactive to light.   Cardiovascular:      Comments: No peripheral edema  Pulmonary:      Effort: Pulmonary effort is normal.   Musculoskeletal:      Comments: Patient has a mild area of tenderness along the left iliac crest.  Pain is worse with " "right lateral bending.  Mild discomfort with right rotation.  Patient has no tenderness over the greater trochanter.  Patient has a negative logroll.  Patient has no pain with hip external rotation or internal rotation.  Patient has no pain with resisted piriformis contraction.   Skin:     General: Skin is warm and dry.   Neurological:      Mental Status: She is alert and oriented to person, place, and time.   Psychiatric:         Behavior: Behavior normal.     XR Hip With or Without Pelvis 2 - 3 View Left (05/13/2024 12:41)-reviewed x-ray images with the patient showing some mild changes of bilateral hip osteoarthritis.      Current Outpatient Medications:     albuterol sulfate  (90 Base) MCG/ACT inhaler, Inhale 2 puffs Every 4 (Four) Hours As Needed for Wheezing., Disp: 8 g, Rfl: 6    Cream Base (Salt Stable LS Advanced) cream, , Disp: , Rfl:     levocetirizine (XYZAL) 5 MG tablet, Take 1 tablet by mouth Daily., Disp: , Rfl:     mometasone (NASONEX) 50 MCG/ACT nasal spray, , Disp: , Rfl:     NON FORMULARY, Allergy Shots-weekly, Disp: , Rfl:     celecoxib (CeleBREX) 200 MG capsule, Take 1 capsule by mouth Daily. With food, Disp: 30 capsule, Rfl: 0     Diagnoses and all orders for this visit:    Iliac crest bone pain  -     celecoxib (CeleBREX) 200 MG capsule; Take 1 capsule by mouth Daily. With food    Other orders  -     NON FORMULARY; Allergy Shots-weekly       Even though the patient has evidence of mild bilateral hip osteoarthritis it does not appear to be causing her present pain.  Her pain is located over the iliac crest and most likely muscular attachment in nature secondary to the kickboxing activity with twisting of the torso etc.  Will treat with anti-inflammatories, I did show her a few exercises to do at home and she has a specific \"hip classes\" at her gym which she would be willing to start.  Follow-up 2 weeks.      EMR Dragon/Transcription disclaimer:    Much of this encounter note is an " electronic transcription/translation of spoken language to printed text.  The electronic translation of spoken language may permit erroneous, or at times, nonsensical words or phrases to be inadvertently transcribed.  Although I have reviewed the note for such errors some may still exist.

## 2024-05-23 ENCOUNTER — PATIENT ROUNDING (BHMG ONLY) (OUTPATIENT)
Dept: SPORTS MEDICINE | Facility: CLINIC | Age: 54
End: 2024-05-23
Payer: COMMERCIAL

## 2024-05-23 NOTE — PROGRESS NOTES
May 23, 2024    A [employer] Message has been sent to the patient for PATIENT ROUNDING with [facility]

## 2024-05-28 ENCOUNTER — TELEPHONE (OUTPATIENT)
Dept: SPORTS MEDICINE | Facility: CLINIC | Age: 54
End: 2024-05-28

## 2024-05-28 NOTE — TELEPHONE ENCOUNTER
Caller: Maranda Gillis     Relationship: SELF     Best call back number: [phone]    What is your medical concern? PATIENT WAS SEEN LAST MONDAY AND GIVEN A PRESCRIPTION FOR GENERIC CELEBREX. SHE TOOK IT TUE, WED AND THURS. SHE NOTICED A RASH (CLUSTER ON HER RIGHT CHEEK) WEDNESDAY EVENING ANDHAS NOT TAKEN IT SINCE THURSDAY. THE RASH DID GO AWAY. SHE IS TAKING IBUPROFEN AS NEEDED BUT IS STILL HAVING A LOT OF HIP PAIN WHICH IS STILL THE SAME. PLEASE ADVISE IS SHE NEEDS ANOTHER PRESCRIPTION OR TO CONTINUE WITH IBUPROFEN. SHE DOES HAVE AN APPT ON 6-3-24.     How long has this issue been going on? 5-22-24    Is your provider already aware of this issue? NO    Have you been treated for this issue? NO

## 2024-05-29 NOTE — TELEPHONE ENCOUNTER
Spoke with the patient about Dr Arias's recommendation of doing the over the counter ibuprofen 2 or 3 tablets 3 times a day.  Patient verbalized understanding and no further action is needed

## 2024-06-03 ENCOUNTER — OFFICE VISIT (OUTPATIENT)
Dept: SPORTS MEDICINE | Facility: CLINIC | Age: 54
End: 2024-06-03
Payer: COMMERCIAL

## 2024-06-03 VITALS
SYSTOLIC BLOOD PRESSURE: 118 MMHG | OXYGEN SATURATION: 99 % | DIASTOLIC BLOOD PRESSURE: 78 MMHG | WEIGHT: 173 LBS | HEART RATE: 88 BPM | HEIGHT: 65 IN | RESPIRATION RATE: 16 BRPM | BODY MASS INDEX: 28.82 KG/M2 | TEMPERATURE: 98 F

## 2024-06-03 DIAGNOSIS — M89.8X8 ILIAC CREST BONE PAIN: Primary | Chronic | ICD-10-CM

## 2024-06-03 PROCEDURE — 99214 OFFICE O/P EST MOD 30 MIN: CPT | Performed by: FAMILY MEDICINE

## 2024-06-03 RX ORDER — INDOMETHACIN 75 MG/1
CAPSULE, EXTENDED RELEASE ORAL
Qty: 60 CAPSULE | Refills: 0 | Status: SHIPPED | OUTPATIENT
Start: 2024-06-03

## 2024-06-03 RX ORDER — INDOMETHACIN 75 MG/1
CAPSULE, EXTENDED RELEASE ORAL
Qty: 60 CAPSULE | Refills: 0 | Status: SHIPPED | OUTPATIENT
Start: 2024-06-03 | End: 2024-06-03

## 2024-06-03 NOTE — PROGRESS NOTES
"Chief Complaint  Hip Pain (Left hip pain remains about the same. Was not able to take celebrex do to rash. -)    Subjective        Maranda Gillis presents to [facility]  History of Present Illness  2-week follow-up left iliac crest pain.  Patient unable to take Celebrex due to a rash which occurred on her face after the third dose.  Pain really has not improved, she did have some improvement over the weekend with rest however this morning her pain is \"right back where it was\".  Objective   Vital Signs:  /78 (BP Location: Left arm, Patient Position: Sitting, Cuff Size: Adult)   Pulse 88   Temp 98 °F (36.7 °C)   Resp 16   Ht 165.1 cm (65\")   Wt 78.5 kg (173 lb)   SpO2 99%   BMI 28.79 kg/m²   Estimated body mass index is 28.79 kg/m² as calculated from the following:    Height as of this encounter: 165.1 cm (65\").    Weight as of this encounter: 78.5 kg (173 lb).             Physical Exam  Vitals reviewed.   Constitutional:       Appearance: She is well-developed.   HENT:      Head: Normocephalic and atraumatic.   Eyes:      Conjunctiva/sclera: Conjunctivae normal.      Pupils: Pupils are equal, round, and reactive to light.   Cardiovascular:      Comments: No peripheral edema  Pulmonary:      Effort: Pulmonary effort is normal.   Musculoskeletal:      Comments: Still with tenderness to palpation just inferior to the left iliac crest, no pain at the greater trochanter.  Negative logroll.  Does have mild discomfort with resisted hip abduction and with right lateral bending.   Skin:     General: Skin is warm and dry.   Neurological:      Mental Status: She is alert and oriented to person, place, and time.   Psychiatric:         Behavior: Behavior normal.      Result Review :                     Assessment and Plan     Diagnoses and all orders for this visit:    1. Iliac crest bone pain (Primary)  -     Discontinue: indomethacin SR (INDOCIN SR) 75 MG CR capsule; 1 bid with food  " Dispense: 60 capsule; Refill: 0  -     Ambulatory Referral to Physical Therapy  -     indomethacin SR (INDOCIN SR) 75 MG CR capsule; I bid with food  Dispense: 60 capsule; Refill: 0      I still feel this is muscular in nature, will try different anti-inflammatory and start formal physical therapy.  If she sees no significant improvement after at least 6 visits of PT then we will check MRI left hip with fiduciary markers.  Certainly can perform MRI sooner if her pain increases before the above-stated time period.      I spent 30 minutes caring for Maranda on this date of service. This time includes time spent by me in the following activities:obtaining and/or reviewing a separately obtained history, performing a medically appropriate examination and/or evaluation , counseling and educating the patient/family/caregiver, ordering medications, tests, or procedures, referring and communicating with other health care professionals , and documenting information in the medical record  Follow Up     No follow-ups on file.  Patient was given instructions and counseling regarding her condition or for health maintenance advice. Please see specific information pulled into the AVS if appropriate.

## 2024-06-20 ENCOUNTER — TELEPHONE (OUTPATIENT)
Dept: SPORTS MEDICINE | Facility: CLINIC | Age: 54
End: 2024-06-20

## 2024-06-20 NOTE — TELEPHONE ENCOUNTER
Caller: Maranda Gillsi    Relationship: Self    Best call back number:     What is the best time to reach you: ANY     Who are you requesting to speak with (clinical staff, provider,  specific staff member): CLINICAL      What was the call regarding: PATIENT HAS BEEN TO 5 PHYSICAL THERAPY APPOINTMENTS AND NOT SEEING MUCH RESULTS.  HER PT WANTS HER TO TRY DRY NEEDLING AND SHE WANTS TO ASK YOU BEFORE DECIDING ANYTHING     Is it okay if the provider responds through [employer]: PLEASE CALL.  LAST APPOINTMENT IS TOMORROW

## 2024-07-25 ENCOUNTER — PATIENT MESSAGE (OUTPATIENT)
Dept: SPORTS MEDICINE | Facility: CLINIC | Age: 54
End: 2024-07-25
Payer: COMMERCIAL

## 2024-07-29 NOTE — TELEPHONE ENCOUNTER
Josue Arias MD 7/26/2024 5:10 PM EDT    I am okay with you signing my name to a letter to this effect, that she cannot participate in fitness exercises due to an injury that we are currently treating. Based on his last note, it sounds like she needs follow-up evaluation to keep solving this problem.  ----- Message -----  From: An Ron MA  Sent: 7/26/2024 11:31 AM EDT  To: Josue Arias MD; *  Subject: FW: Request for letter to cancel membership *    Would either of you be okay with writing a letter for her request?. Or see if she wants to come in for re-evaluation?.    Thank you  An

## 2024-08-08 ENCOUNTER — OFFICE VISIT (OUTPATIENT)
Dept: SPORTS MEDICINE | Facility: CLINIC | Age: 54
End: 2024-08-08
Payer: COMMERCIAL

## 2024-08-08 VITALS
BODY MASS INDEX: 28.82 KG/M2 | HEART RATE: 80 BPM | OXYGEN SATURATION: 97 % | DIASTOLIC BLOOD PRESSURE: 70 MMHG | WEIGHT: 173 LBS | RESPIRATION RATE: 16 BRPM | SYSTOLIC BLOOD PRESSURE: 122 MMHG | HEIGHT: 65 IN

## 2024-08-08 DIAGNOSIS — M25.552 PAIN OF LEFT HIP: Primary | ICD-10-CM

## 2024-08-08 DIAGNOSIS — M16.12 PRIMARY OSTEOARTHRITIS OF LEFT HIP: ICD-10-CM

## 2024-08-08 DIAGNOSIS — M89.8X8 ILIAC CREST BONE PAIN: ICD-10-CM

## 2024-08-08 PROCEDURE — 99213 OFFICE O/P EST LOW 20 MIN: CPT | Performed by: FAMILY MEDICINE

## 2024-08-08 NOTE — PROGRESS NOTES
"Maranda is a 54 y.o. year old female presents to [facility]    Chief Complaint   Patient presents with    Left Hip - Follow-up, Pain     F/u eval for LT hip pain with iliac crest bone pain - reports she is doing okay today, no radiating pain no neuro sxs- previous treatment with Dr. Manrique - x-rays done on 05/13/2024 - has completed formal PT but still working on home exercises and gym workouts        History of Present Illness  History of Present Illness  The patient is a 54-year-old female who presents for evaluation of left hip pain.    She continues to experience mild hip pain, although it is gradually improving. The pain is localized to the front of her hip, with no pain in the groin. The pain intensifies when she rises from a seated position, and she occasionally limps, although this has improved significantly. Prolonged sitting exacerbates the pain, but once she is up and moving, it subsides. Certain standing positions previously caused problems, but these have since ceased. Physical therapy has provided some relief. Initially, she attempted to manage the pain with ibuprofen, but it did not provide relief. She began experiencing issues after a kickboxing class a few weeks ago, a activity she had never encountered before. She also had a fall around the same time, extending her leg and landing on her knee.    I have reviewed the patient's medical, family, and social history in detail and updated the computerized patient record.    /70 (BP Location: Left arm, Patient Position: Sitting, Cuff Size: Adult)   Pulse 80   Resp 16   Ht 165.1 cm (65\")   Wt 78.5 kg (173 lb)   SpO2 97%   BMI 28.79 kg/m²      Physical Exam    Vital signs reviewed.   General: No acute distress.  Eyes: conjunctiva clear; pupils equally round and reactive  ENT: external ears atraumatic  CV: no peripheral edema  Resp: normal respiratory effort, no use of accessory muscles  Skin: no rashes or wounds; " normal turgor  Psych: mood and affect appropriate; recent and remote memory intact  Neuro: sensation to light touch intact    MSK Exam  Physical Exam  Left hip examination reveals a mildly positive logroll. Negative BLANQUITA, FADIR. Negative Stinchfield. Minimal tenderness along the iliac crest.    XR Hip With or Without Pelvis 2 - 3 View Left (05/13/2024 12:41)       Results           Diagnoses and all orders for this visit:    Pain of left hip    Iliac crest bone pain    Primary osteoarthritis of left hip      Assessment & Plan  1. Left hip pain with pain near the iliac crest.  Symptoms are improving. She will continue with PT, home exercises. I did discuss possibility of advanced imaging, MRI of the hip should her symptoms intensify.    Follow-up  The patient will follow up as needed.          Follow Up     Patient was given instructions and counseling regarding her condition or for health maintenance advice. Please see specific information pulled into the AVS if appropriate.     Patient or patient representative verbalized consent for the use of Ambient Listening during the visit with  TRENTON Love Jr., DO for chart documentation. 8/8/2024  10:06 EDT

## 2024-11-19 ENCOUNTER — TELEPHONE (OUTPATIENT)
Dept: INTERNAL MEDICINE | Facility: CLINIC | Age: 54
End: 2024-11-19
Payer: COMMERCIAL

## 2024-11-19 DIAGNOSIS — Z12.11 COLON CANCER SCREENING: Primary | ICD-10-CM

## 2024-11-19 DIAGNOSIS — Z12.11 ENCOUNTER FOR COLORECTAL CANCER SCREENING: Primary | ICD-10-CM

## 2024-11-19 DIAGNOSIS — Z12.12 ENCOUNTER FOR COLORECTAL CANCER SCREENING: Primary | ICD-10-CM

## 2024-11-19 NOTE — TELEPHONE ENCOUNTER
Caller: GillisMaranda duncan    Relationship: Self    Best call back number: [phone]*    What is the medical concern/diagnosis: ROUTINE COLONOSCOPY    What specialty or service is being requested: GASTROENTEROLOGY    What is the provider, practice or medical service name: [facility]    What is the office location: [place] IF POSSIBLE    What is the office phone number:     Any additional details: LAST COLONOSCOPY, OCTOBER 2019. INSURANCE VERIFIED WITH PATIENT.

## 2024-11-20 ENCOUNTER — OFFICE VISIT (OUTPATIENT)
Dept: SPORTS MEDICINE | Facility: CLINIC | Age: 54
End: 2024-11-20
Payer: COMMERCIAL

## 2024-11-20 ENCOUNTER — LAB (OUTPATIENT)
Dept: LAB | Facility: HOSPITAL | Age: 54
End: 2024-11-20
Payer: COMMERCIAL

## 2024-11-20 VITALS
HEART RATE: 78 BPM | HEIGHT: 65 IN | RESPIRATION RATE: 16 BRPM | WEIGHT: 173 LBS | SYSTOLIC BLOOD PRESSURE: 122 MMHG | BODY MASS INDEX: 28.82 KG/M2 | OXYGEN SATURATION: 99 % | DIASTOLIC BLOOD PRESSURE: 80 MMHG

## 2024-11-20 DIAGNOSIS — M19.011 OSTEOARTHRITIS OF STERNOCLAVICULAR JOINT, RIGHT: ICD-10-CM

## 2024-11-20 DIAGNOSIS — M89.8X1 PAIN OF RIGHT CLAVICLE: Primary | ICD-10-CM

## 2024-11-20 DIAGNOSIS — N18.31 STAGE 3A CHRONIC KIDNEY DISEASE: ICD-10-CM

## 2024-11-20 LAB — ASO AB SERPL-ACNC: NEGATIVE [IU]/ML

## 2024-11-20 PROCEDURE — 86038 ANTINUCLEAR ANTIBODIES: CPT | Performed by: FAMILY MEDICINE

## 2024-11-20 PROCEDURE — 36415 COLL VENOUS BLD VENIPUNCTURE: CPT | Performed by: FAMILY MEDICINE

## 2024-11-20 PROCEDURE — 86063 ANTISTREPTOLYSIN O SCREEN: CPT | Performed by: FAMILY MEDICINE

## 2024-11-20 PROCEDURE — 86431 RHEUMATOID FACTOR QUANT: CPT | Performed by: FAMILY MEDICINE

## 2024-11-20 PROCEDURE — 81374 HLA I TYPING 1 ANTIGEN LR: CPT | Performed by: FAMILY MEDICINE

## 2024-11-20 PROCEDURE — 99214 OFFICE O/P EST MOD 30 MIN: CPT | Performed by: FAMILY MEDICINE

## 2024-11-20 PROCEDURE — 86200 CCP ANTIBODY: CPT | Performed by: FAMILY MEDICINE

## 2024-11-20 RX ORDER — LEVALBUTEROL TARTRATE 45 UG/1
2 AEROSOL, METERED ORAL EVERY 6 HOURS PRN
COMMUNITY
Start: 2024-11-06

## 2024-11-20 NOTE — PROGRESS NOTES
Maranda is a 54 y.o. year old female presents to [facility]    Chief Complaint   Patient presents with    Collarbone Injury     Re-eval for RT sided clavicle pain, NKI, previous eval with Dr. Manrique 03/2023 - more painful and swollen now, difficulty with lifting, especially in the gym - popping sensation with ROM, soreness in the am, trouble with sleeping on the right side - here for further evaluation and treatment        History of Present Illness  History of Present Illness  The patient is a 54-year-old female who presents for evaluation of right shoulder pain.    She reports a noticeable swelling on her collar bone that has been present for some time but has recently increased in size and pain, affecting her daily activities. She experiences a popping sensation in her arm when she moves it, accompanied by pain. She is unsure if this is related to a previous trauma. The pain has become so severe that she struggles to lift even a 5-pound weight or her purse. She was previously informed that her condition was due to bone-on-bone contact and was advised to consider steroid injections if the pain became severe. However, she was also warned that the location of the pain, near her heart, made this a less desirable option. 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 She is left-handed and primarily uses her left hand for mouse work.    In March 2023, she had an episode of blacking out and falling, but she does not believe this is related to her current symptoms.    She has noticed recent pain in her ankle, which she suspects may be arthritis. She reports no issues with the small joints in her hands. She has a history of neck problems, for which she receives monthly massages that provide some relief.    She is currently undergoing tests for kidney issues and is hesitant to take medication  "due to elevated levels detected in these tests. She has been using CBD cream for pain relief, which provides some relief but requires daily application. She is interested in exploring other treatment options.    I have reviewed the patient's medical, family, and social history in detail and updated the computerized patient record.    /80 (BP Location: Right arm, Patient Position: Sitting, Cuff Size: Adult)   Pulse 78   Resp 16   Ht 165.1 cm (65\")   Wt 78.5 kg (173 lb)   SpO2 99%   BMI 28.79 kg/m²      Physical Exam    Vital signs reviewed.   General: No acute distress.  Eyes: conjunctiva clear; pupils equally round and reactive  ENT: external ears atraumatic  CV: no peripheral edema  Resp: normal respiratory effort, no use of accessory muscles  Skin: no rashes or wounds; normal turgor  Psych: mood and affect appropriate; recent and remote memory intact  Neuro: sensation to light touch intact    MSK Exam  Physical Exam  Right shoulder demonstrates full range of motion. Negative drop arm and empty can tests. Positive scarf sign and Alba test. Soft tissue swelling noted along the sternoclavicular joint. No crepitus. Tenderness is present along the sternoclavicular joint.    XR Clavicle Right (03/30/2023 16:49)   XR Shoulder 2+ View Right (03/30/2023 16:48)     Results           Diagnoses and all orders for this visit:    Pain of right clavicle  -     Ibuprofen 3 %, Gabapentin 10 %, Baclofen 2 %, lidocaine 4 %; Apply 1-2 g topically to the appropriate area as directed 3 (Three) to 4 (Four) times daily.  -     JAS  -     Antistreptolysin O Screen  -     HLA-B27 Antigen  -     Rheumatoid Arthritis (RA) Profile  -     MRI chest wo contrast; Future    Osteoarthritis of sternoclavicular joint, right  -     Ibuprofen 3 %, Gabapentin 10 %, Baclofen 2 %, lidocaine 4 %; Apply 1-2 g topically to the appropriate area as directed 3 (Three) to 4 (Four) times daily.  -     JAS  -     Antistreptolysin O Screen  -     " HLA-B27 Antigen  -     Rheumatoid Arthritis (RA) Profile  -     MRI chest wo contrast; Future    Stage 3a chronic kidney disease    Other orders  -     levalbuterol (XOPENEX HFA) 45 MCG/ACT inhaler; Inhale 2 puffs Every 6 (Six) Hours As Needed for Wheezing.      Assessment & Plan  1. Sternoclavicular arthritis.  The patient reports swelling and pain in the collarbone area, which has been worsening and interfering with daily activities. There is no history of significant trauma, but she recalls a blackout episode last year. Examination reveals soft tissue swelling along the sternoclavicular joint with tenderness. An autoimmune panel will be conducted to rule out autoimmune causes. An MRI will be ordered for a detailed examination of the joint. A compounded pain reliever cream, which includes an anti-inflammatory, will be prescribed for topical application. The prescription will be sent to [facility].    2. Neck pain.  The patient has a history of neck issues, with previous x-rays showing disc problems. She receives monthly massages which provide some relief. No new treatment is planned at this time.    3. Right shoulder pain.  The patient experiences pain and occasional popping in the right shoulder, which worsens with movement. Examination shows a positive scarf sign and positive Alba test. Physical therapy is recommended to manage the pain and improve function.            Follow Up     Patient was given instructions and counseling regarding her condition or for health maintenance advice. Please see specific information pulled into the AVS if appropriate.     Patient or patient representative verbalized consent for the use of Ambient Listening during the visit with  TRENTON Love Jr., DO for chart documentation. 11/20/2024  11:17 EST

## 2024-11-21 LAB
ANA SER QL: NEGATIVE
CCP IGA+IGG SERPL IA-ACNC: 52 UNITS (ref 0–19)
RHEUMATOID FACT SERPL-ACNC: 11.4 IU/ML

## 2024-11-27 LAB — HLA-B27 QL NAA+PROBE: POSITIVE

## 2024-12-02 ENCOUNTER — PATIENT MESSAGE (OUTPATIENT)
Dept: SPORTS MEDICINE | Facility: CLINIC | Age: 54
End: 2024-12-02
Payer: COMMERCIAL

## 2024-12-02 DIAGNOSIS — Z15.89 HLA B27 POSITIVE: Primary | ICD-10-CM

## 2024-12-04 ENCOUNTER — HOSPITAL ENCOUNTER (OUTPATIENT)
Dept: MRI IMAGING | Facility: HOSPITAL | Age: 54
Discharge: HOME | End: 2024-12-04
Admitting: FAMILY MEDICINE
Payer: COMMERCIAL

## 2024-12-04 DIAGNOSIS — M89.8X1 PAIN OF RIGHT CLAVICLE: ICD-10-CM

## 2024-12-04 DIAGNOSIS — M19.011 OSTEOARTHRITIS OF STERNOCLAVICULAR JOINT, RIGHT: ICD-10-CM

## 2024-12-04 PROCEDURE — 71550 MRI CHEST W/O DYE: CPT

## 2025-02-06 ENCOUNTER — OFFICE VISIT (OUTPATIENT)
Age: 55
End: 2025-02-06
Payer: COMMERCIAL

## 2025-02-06 VITALS
WEIGHT: 186.4 LBS | BODY MASS INDEX: 31.06 KG/M2 | HEIGHT: 65 IN | SYSTOLIC BLOOD PRESSURE: 114 MMHG | HEART RATE: 79 BPM | OXYGEN SATURATION: 98 % | DIASTOLIC BLOOD PRESSURE: 70 MMHG | TEMPERATURE: 98.1 F

## 2025-02-06 DIAGNOSIS — Z15.89 HLA B27 (HLA B27 POSITIVE): ICD-10-CM

## 2025-02-06 DIAGNOSIS — M25.511 STERNOCLAVICULAR JOINT PAIN, RIGHT: ICD-10-CM

## 2025-02-06 DIAGNOSIS — R76.8 CYCLIC CITRULLINATED PEPTIDE (CCP) ANTIBODY POSITIVE: ICD-10-CM

## 2025-02-06 DIAGNOSIS — R69: Primary | ICD-10-CM

## 2025-02-06 NOTE — PROGRESS NOTES
RHEUMATOLOGY NEW PATIENT VISIT  2025    Patient Name: Maranda Gillis : 1970 Medical Record: [id number]    PCP: Ani Vazquez APRN    Referring provider: Phi Love*    REASON FOR CONSULTATION  Right sternoclavicular pain, elevated CCP    History of Present Illness  Maranda Gillis is a 54 y.o. 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  She had seen them for right sternoclavicular swelling and pain, and an MRI of the sternoclavicular joint showed evidence of advanced arthritis at the right sternoclavicular joint where there is joint space loss and surrounding bone marrow edema.    A plan for localized right intra-articular sternoclavicular joint steroid injection was held pending rheumatology evaluation.    Briefly, she has a history of right sternoclavicular joint pain, which she attributes to a previous injury.    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 She reports waking up with sore joints a few times a week. She has a history of middle finger pain, which she reports as being more noticeable when she applies pressure.  She also has low back pain, chronic ankle issues for the past decade, which have limited her physical activity, including weightlifting.   She reports daily morning stiffness in her ankles, lasting less than 30 minutes, and occasional swelling, which resolves with foot elevation.  She has not noticed pain and swelling of the small joints of her hands, wrist, and foot.    She occasional experiences burning sensations on the tops of her feet. She has a history of plantar fibromas on the soles of her feet, for which she has received steroid injections and uses a topical treatment.  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    Rheumatology Disease  History    No prior diagnosis of autoimmune or rheumatologic condition.  Negative review for: Red eye (uveitis), Psoriasis, IBD, Inflammatory back pain, Plantar fasciitis/Achilles tendinitis, dactylitis or enthesitis    She has been diagnosed with stage 2 kidney disease.        SOCIAL HISTORY  She does not smoke. She drinks alcohol but not as much as she used to.    FAMILY HISTORY  She does not know of any family history of rheumatoid arthritis or autoimmune diseases such as lupus, scleroderma, sarcoidosis, or myositis. Her mother always complained about having arthritis, but she is unsure if she was ever diagnosed.    Results  Laboratory Studies  11/20/2024  RF negative  CCP 52  JAS negative  1/24/2023  Ferritin-232 elevated  Pertinent imaging:  MRI chest including status post bilateral 12/4/2024    IMPRESSION:  Advanced arthritis at the right sternoclavicular joint where there is joint space loss and surrounding bone marrow edema. There is also dorsomedial subluxation of the right clavicular head at the sternomanubrial joint which may account for the palpable abnormality. Right sternoclavicular joint effusion with synovitis.   This report was finalized on 12/4/2024       Current Outpatient Medications:     Ibuprofen 3 %, Gabapentin 10 %, Baclofen 2 %, lidocaine 4 %, Apply 1-2 g topically to the appropriate area as directed 3 (Three) to 4 (Four) times daily., Disp: 240 g, Rfl: 3    levalbuterol (XOPENEX HFA) 45 MCG/ACT inhaler, Inhale 2 puffs Every 6 (Six) Hours As Needed for Wheezing., Disp: , Rfl:     levocetirizine (XYZAL) 5 MG tablet, Take 1 tablet by mouth Daily., Disp: , Rfl:     mometasone (NASONEX) 50 MCG/ACT nasal spray, , Disp: , Rfl:     NON FORMULARY, Allergy Shots-weekly, Disp: , Rfl:     albuterol sulfate  (90 Base) MCG/ACT inhaler, Inhale 2 puffs Every 4 (Four) Hours As Needed for Wheezing. (Patient not taking: Reported on 2/6/2025), Disp: 8 g, Rfl: 6    Cream Base (Salt Stable LS Advanced)  "cream, , Disp: , Rfl:      There are no discontinued medications.     Past Medical History:   Diagnosis Date    Allergic     Colon polyp     Diverticulosis October 2019    HL (hearing loss)     Migraine     with OCPS    Ovarian cyst     Renal insufficiency     Possibly elevated levels in kidney-seeing doctor this month for a followup    Shoulder injury     Possible injury to collarbone    Vitamin D deficiency         Past Surgical History:   Procedure Laterality Date    BUNIONECTOMY      bilateral    COLONOSCOPY N/A 10/14/2019    Procedure: COLONOSCOPY with polypectomy;  Surgeon: Claudette Liu MD;  Location: [facility];  Service: Gastroenterology    SKIN LESION EXCISION       Allergies   Allergen Reactions    Montelukast Other (See Comments)     Hallucinations when taking    Other      ALLERGIC TO WEEDS, MOLDS, DUST MITES, GRASS, TREES, CATS    Singulair [Montelukast Sodium] Hallucinations    Celebrex [Celecoxib] Rash     Physical Exam       Vitals:    02/06/25 1503   BP: 114/70   BP Location: Left arm   Patient Position: Sitting   Cuff Size: Adult   Pulse: 79   Temp: 98.1 °F (36.7 °C)   TempSrc: Oral   SpO2: 98%   Weight: 84.6 kg (186 lb 6.4 oz)   Height: 165.1 cm (65\")     Gen: Well-developed, well-nourished adult in no apparent distress. Pleasant and cooperative. Alert and oriented.   HEENT: EOMI, MMM, oropharynx clear without oral ulcers, no lacrimal gland enlargement, normal salivary pooling, normal temporal arteries without tenderness or beading.  Chest: Normal work of breathing. CTAB without wheezing/rhonchi/rales. ??  CV: RRR, normal S1/S2, no murmurs/rubs/gallops heard. ??  Extremities: Warm, 2+ distal pulses, no cyanosis, clubbing, or edema.  Neuro: Good comprehension/cognition. Muscle strength 5/5 in all extremities. Gait normal.??  ??Skin: No alopecia, nail change (including no nail pitting), rashes, bruising, petechiae, sclerodactyly, digital pits, telangiectasias, tophi, appreciable calcinosis, or " nodules.??    Comprehensive Musculoskeletal Examination:?  - Jaw, neck without limited ROM.?  -sternoclavicular joint: right SC joint is non-tender, no erythema, with a bony prominence and suspected subluxation.  Left SC joint is wnl  - Shoulders, elbows, wrists, hands/fingers, knees, ankles, feet/toes: No deformity, erythema, warmth, swelling, effusion, tenderness, or limited ROM.??  - Lumbosacral spine and hips without limited ROM.?? Negative BLANQUITA.     LABS AND IMAGING ll laboratory data was reviewed and relevant values are noted as below.  See lab comments in HPI     CERVICAL SPINE: AP, LATERAL, SWIMMER'S VIEW     HISTORY: Chronic right shoulder pain. Neck pain.     COMPARISON: None.     FINDINGS: There is degenerative disc disease at C5-6 where there is mild  disc space narrowing and there is endplate spur formation. There is also  facet arthritis that appears greatest at C7-T1 though is present at  multiple additional levels. No fracture or acute osseous abnormality is  evident. The prevertebral soft tissues appear normal.     IMPRESSION:  Degenerative disc disease best demonstrated at C5-6.  Multilevel facet arthritis. No evidence for fracture or acute  abnormality.     This report was finalized on 4/5/2023 7:44 AM by Dr. Mychal Dubon M.D.    PROCEDURE:  XR HIP W OR WO PELVIS 2-3 VIEW LEFT-     HISTORY: Left hip pain.     COMPARISON: CT abdomen and pelvis 6/26/2019.     FINDINGS:       3 views of the pelvis and left hip were obtained.     No acute fracture or malalignment is identified. There is mild bilateral  hip osteoarthritis. If there is persistent clinical concern for acute  fracture or inability to bear weight, further evaluation with dedicated  CT or MRI would be recommended.           This report was finalized on 5/13/2024 3:10 PM by Dr. Joanna Patel M.D  on Workstation: [id number]    MRI Chest Without Contrast  Narrative: MRI CHEST/STERNOCLAVICULAR JOINTS WITHOUT CONTRAST     HISTORY: Pain  in the right sternoclavicular joint region. Lump.      TECHNIQUE: MRI of the sternoclavicular joints includes coronal T1, T2  fat-sat as well as axial T1, T2, sagittal PD, T1, T2-weighted sequences.     FINDINGS: There is arthritis at the right sternoclavicular joint. There  is joint space narrowing with subchondral sclerosis. Reactive bone  marrow edema is present throughout the medial right clavicle. There is  also a right sternoclavicular joint effusion with surrounding  synovial/capsular thickening consistent with synovitis. Mild arthritic  changes are present at the left sternoclavicular joint. The right medial  clavicle exhibits a superior positioned when compared to the left  clavicle associated with arthritic changes at the right sternoclavicular  joint and dorsal-medial subluxation. No fracture is demonstrated. Medial  pectoralis musculature appears within normal limits.     There are changes of degenerative disc disease in the cervical spine.      Impression: Advanced arthritis at the right sternoclavicular joint where  there is joint space loss and surrounding bone marrow edema. There is  also dorsomedial subluxation of the right clavicular head at the  sternomanubrial joint which may account for the palpable abnormality.  Right sternoclavicular joint effusion with synovitis.     This report was finalized on 12/4/2024 11:29 AM by Mychal Dubon M.D  on Workstation: [id number]       Assessment & Plan  Maranda Gillis is a 54 y.o. female presents with right sternoclavicular joint (SCJ) arthritis, confirmed by MRI showing joint narrowing and surrounding edema. The elevated cyclic citrullinated peptide (CCP) of 52, in the context of a normal rheumatoid factor (RF) and absence of clinical synovitis or arthritis in the hands and feet, suggests a possible early/subclinical disease or atypical presentation of rheumatoid arthritis (RA) or another inflammatory arthropathy.  On the other hand, the positive HLA-B27 may  also suggest a spondyloarthropathy, potentially ankylosing spondylitis (AS) or another form of axial spondyloarthritis (axSpA)    Plan:  -Since patient cannot take NSAIDs due to her stage II kidney disease, we will recommend patient follows up with Sports medicine for localized intra-articular corticosteroid injection.       - Given the elevated CCP, we discussed considering starting a DMARD such as methotrexate, which is effective in managing inflammatory arthritis and preventing disease progression, patient is not keen now, would like to hold off for now.    We will revisit the subject during her follow-up appointment 3 months.  Meanwhile, she will monitor for symptoms of hand swelling, stiffness, or difficulty holding objects, or if the small joints of her toes become red, painful, and immobile    - Biologic DMARDs will be considered if her SCJ arthritis is recurrent. Infliximab has shown efficacy in similar cases of SCJ arthritis, particularly in immune-mediated conditions. Etanercept may also be considered as an alternative.     - Will discuss physical therapy for joint mobilization and strengthening exercises to maintain function and reduce pain, if ongoing stiffness and pain following SCJ steroid injection     - Patient education has been provided about the nature of SCJ arthritis, the importance of medication adherence, and the potential need for long-term management.  Lifestyle modifications, including activity adjustments and weight management, to reduce joint stress was discussed.    - She will also consult with her podiatrist regarding appropriate footwear for her toes and ankles.     Follow-up  The patient will follow up in 3 months to monitor response to localized steroid injection, discuss initiating DMARD therapy.  Will continue to monitor for features of spondylarthritis as well.  She has cervical spine and hip and pelvis x-rays done in the past which does not suggest features of  spondylarthritis.    Diagnoses and all orders for this visit:    1. Subclinical RA disease or syndrome (Primary)    2. Sternoclavicular joint pain, right    3. Cyclic citrullinated peptide (CCP) antibody positive    4. HLA B27 (HLA B27 positive)      Patient or patient representative verbalized consent for the use of Ambient Listening during the visit with  Reny Martell MD for chart documentation.     I spent 60 minutes caring for Maranda on this date of service. This time includes time spent by me in the following activities:preparing for the visit, reviewing tests, obtaining and/or reviewing a separately obtained history, performing a medically appropriate examination and/or evaluation , counseling and educating the patient/family/caregiver, documenting information in the medical record, and independently interpreting results and communicating that information with the patient/family/caregiver

## 2025-02-09 PROBLEM — Z15.89 HLA B27 (HLA B27 POSITIVE): Status: ACTIVE | Noted: 2025-02-09

## 2025-02-09 PROBLEM — R69: Status: ACTIVE | Noted: 2025-02-09

## 2025-02-09 PROBLEM — M25.511 STERNOCLAVICULAR JOINT PAIN, RIGHT: Status: ACTIVE | Noted: 2025-02-09

## 2025-02-09 PROBLEM — R76.8 CYCLIC CITRULLINATED PEPTIDE (CCP) ANTIBODY POSITIVE: Status: ACTIVE | Noted: 2025-02-09

## 2025-02-09 NOTE — PATIENT INSTRUCTIONS
Thank you for your new patient visit with rheumatology  You were evaluated for right sternoclavicular joint inflammation, with associated positive CCP and HLA-B27.    Positive CCP with joint inflammation usually indicate concern for rheumatoid arthritis, but at time of evaluating you there was no history of or demonstration of inflammation in small joints of the hand or foot.  You may be having subclinical rheumatoid arthritis.    Also with the positive HLA-B27, we also keep an eye for another form of arthritis called spondylarthritis.  Clinical features to support this form of arthritis was not very remarkable at the time of exam.    We discussed initiating systemic disease modifying treatment for early arthritis, your wanted to hold off for now.  We will reevaluate you in 3 months and discuss therapy at that time.    In the meantime, please follow-up with sports medicine for the localized steroid sternoclavicular injection that was already planned.    Please monitor and contact our rheumatology clinic if you have progressive sternoclavicular joint inflammation despite the steroid injection, also if you experience hand pain and swellings, loss of function in the hands and foot.  This will be an indication to commence therapy and you do not have to wait till the next visit follow-up.

## 2025-03-12 ENCOUNTER — OFFICE VISIT (OUTPATIENT)
Dept: SPORTS MEDICINE | Facility: CLINIC | Age: 55
End: 2025-03-12
Payer: COMMERCIAL

## 2025-03-12 VITALS
SYSTOLIC BLOOD PRESSURE: 102 MMHG | WEIGHT: 186 LBS | HEIGHT: 65 IN | DIASTOLIC BLOOD PRESSURE: 70 MMHG | HEART RATE: 71 BPM | BODY MASS INDEX: 30.99 KG/M2 | OXYGEN SATURATION: 99 % | RESPIRATION RATE: 16 BRPM

## 2025-03-12 DIAGNOSIS — M19.011 OSTEOARTHRITIS OF STERNOCLAVICULAR JOINT, RIGHT: Primary | ICD-10-CM

## 2025-03-12 DIAGNOSIS — Z15.89 HLA B27 POSITIVE: ICD-10-CM

## 2025-03-12 PROCEDURE — 99214 OFFICE O/P EST MOD 30 MIN: CPT | Performed by: FAMILY MEDICINE

## 2025-03-12 NOTE — PROGRESS NOTES
"Maranda is a 54 y.o. year old female presents to [facility]    Chief Complaint   Patient presents with    Right Clavicle - Pain, Follow-up     F/u eval for RT clavicle/sc - here for consideratio of repeat steroid injection - reports having swelling and pain today        History of Present Illness  History of Present Illness  The patient presents for evaluation of sternoclavicular joint pain.    She reports a recent exacerbation of her sternoclavicular joint pain, which was relatively mild until today. Upon awakening, she noticed swelling and increased discomfort in the area. She has been under the care of a rheumatologist, with a follow-up appointment scheduled in 6 months. Despite the option to start medication, she chose not to due to the perceived minimal impact of the condition. She has not sought any treatment since her last visit and has not attempted topical therapy. Her primary concern is to resume her workout routine, as even lifting 5-pound weights exacerbates her pain. She also reports occasional popping sensations in the joint during movement. She expresses a preference for topical over oral medications due to potential kidney-related side effects. The severity of her pain appears to be influenced by her sleeping position and arm movements.    I have reviewed the patient's medical, family, and social history in detail and updated the computerized patient record.    /70 (BP Location: Left arm, Patient Position: Sitting, Cuff Size: Adult)   Pulse 71   Resp 16   Ht 165.1 cm (65\")   Wt 84.4 kg (186 lb)   SpO2 99%   BMI 30.95 kg/m²      Physical Exam    Vital signs reviewed.   General: No acute distress.  Eyes: conjunctiva clear; pupils equally round and reactive  ENT: external ears atraumatic  CV: no peripheral edema  Resp: normal respiratory effort, no use of accessory muscles  Skin: no rashes or wounds; normal turgor  Psych: mood and affect appropriate; recent " and remote memory intact  Neuro: sensation to light touch intact    MSK Exam  Physical Exam  There is minimal soft tissue swelling and bony tenderness in the right clavicle. Pain in the shoulder is worsened with Alba maneuver. A palpable click is noted with abduction and external rotation of the shoulder.    MRI Chest Without Contrast (12/04/2024 10:13)     No visits with results within 2 Month(s) from this visit.   Latest known visit with results is:   Office Visit on 11/20/2024   Component Date Value Ref Range Status    JAS Direct 11/20/2024 Negative  Negative Final    ASO 11/20/2024 Negative  Negative Final    HLA B27 11/20/2024 Positive   Final    HLA-B*27 Positive  This patient is positive for an HLA-B*27 allele that is  associated with spondyloarthropathies. This procedure rules  out the B*27:06 and 27:09 alleles, which the literature  suggests are not associated with spondyloarthropathies.  There are several very rare HLA allele that are not ruled  out by this assay that may result in a false positive.  Clinical correlation is recommended.  B27 allele interpretation for all loci based on IMGT/HLA  database version 3.51.0  This test was developed and its performance characteristics  determined by [facility].  It has not been cleared or approved  by the Food and Drug Administration.  [facility] CLIA ID Number [id number]  This test was performed using Polymerase Chain Reaction (PCR) and  Sequence Specific Oligonucleotide Probes (SSOP) technique. Sequence  Based Typing (SBT) may be used as a supplemental method when  necessary.  If you have questions, please call HLA customer service at  [phone] or email at [email or web address].    RA Latex Turbid 11/20/2024 11.4  <14.0 IU/mL Final    CCP Antibodies IgG/IgA 11/20/2024 52 (H)  0 - 19 units Final                              Negative               <20                            Weak positive      20 - 39                            Moderate positive  40 - 59                             Strong positive        >59       Results           Diagnoses and all orders for this visit:    Osteoarthritis of sternoclavicular joint, right  -     Ibuprofen 3 %, Baclofen 2 %, lidocaine 4 %; Apply 1-2 g topically to the appropriate area as directed 3 (Three) to 4 (Four) times daily.    HLA B27 positive  -     Ibuprofen 3 %, Baclofen 2 %, lidocaine 4 %; Apply 1-2 g topically to the appropriate area as directed 3 (Three) to 4 (Four) times daily.      Assessment & Plan  1. Pain in the sternoclavicular joint.  The patient's condition is autoimmune in nature, necessitating a targeted approach to manage the symptoms of swelling and inflammation. A comprehensive discussion was held regarding various treatment modalities, including injections and topical anti-inflammatories. She expressed a preference for topical treatment at this juncture. A prescription for a compounded cream was provided, with instructions to apply it up to four times daily. The prescription was sent to [facility] pharmacy. She was advised to contact the clinic if the topical treatment proves ineffective, at which point an injection into the sternoclavicular joint under ultrasound guidance would be considered.          Follow Up     Patient was given instructions and counseling regarding her condition or for health maintenance advice. Please see specific information pulled into the AVS if appropriate.     Patient or patient representative verbalized consent for the use of Ambient Listening during the visit with  TRENTON Love Jr., DO for chart documentation. 3/12/2025  10:36 EDT

## 2025-04-07 ENCOUNTER — PREP FOR SURGERY (OUTPATIENT)
Dept: SURGERY | Facility: SURGERY CENTER | Age: 55
End: 2025-04-07
Payer: COMMERCIAL

## 2025-04-07 DIAGNOSIS — Z12.11 ENCOUNTER FOR SCREENING COLONOSCOPY: Primary | ICD-10-CM

## 2025-04-07 DIAGNOSIS — Z86.0100 HISTORY OF COLON POLYPS: ICD-10-CM

## 2025-04-07 RX ORDER — SODIUM CHLORIDE 0.9 % (FLUSH) 0.9 %
3 SYRINGE (ML) INJECTION EVERY 12 HOURS SCHEDULED
OUTPATIENT
Start: 2025-04-07

## 2025-04-07 RX ORDER — SODIUM CHLORIDE, SODIUM LACTATE, POTASSIUM CHLORIDE, CALCIUM CHLORIDE 600; 310; 30; 20 MG/100ML; MG/100ML; MG/100ML; MG/100ML
30 INJECTION, SOLUTION INTRAVENOUS CONTINUOUS PRN
OUTPATIENT
Start: 2025-04-07 | End: 2025-04-08

## 2025-04-07 RX ORDER — SODIUM CHLORIDE 0.9 % (FLUSH) 0.9 %
10 SYRINGE (ML) INJECTION AS NEEDED
OUTPATIENT
Start: 2025-04-07

## 2025-05-29 ENCOUNTER — OFFICE VISIT (OUTPATIENT)
Age: 55
End: 2025-05-29
Payer: COMMERCIAL

## 2025-05-29 VITALS
TEMPERATURE: 99.3 F | SYSTOLIC BLOOD PRESSURE: 124 MMHG | DIASTOLIC BLOOD PRESSURE: 70 MMHG | HEART RATE: 92 BPM | WEIGHT: 184.5 LBS | HEIGHT: 65 IN | OXYGEN SATURATION: 97 % | BODY MASS INDEX: 30.74 KG/M2

## 2025-05-29 DIAGNOSIS — M25.511 STERNOCLAVICULAR JOINT PAIN, RIGHT: ICD-10-CM

## 2025-05-29 DIAGNOSIS — R69: Primary | ICD-10-CM

## 2025-05-29 DIAGNOSIS — Z15.89 HLA B27 (HLA B27 POSITIVE): ICD-10-CM

## 2025-05-29 RX ORDER — AMANTADINE HYDROCHLORIDE 100 MG/1
100 TABLET ORAL 2 TIMES DAILY
COMMUNITY
Start: 2025-03-19

## 2025-05-29 NOTE — PROGRESS NOTES
RHEUMATOLOGY FOLLOW UP VISIT  2025  Patient Name: Maranda Gillis : 1970 Medical Record: [id number]    History of Present Illness  Maranda Gillis is a 54 y.o. female who presents for evaluation of sternoclavicular joint arthritis, elevated CCP, HLA B27 positive   She otherwise asymptomatic and is not interested in current DMARD therapy     In the interval:  She has been diagnosed with subclinical rheumatoid arthritis, as evidenced by her elevated titers.   She reports experiencing pain in the middle finger of her left hand and the ring finger of her right hand, which she describes as migratory and intermittent.   She does not experience morning stiffness but notes occasional soreness during the day.  Her occupation involves extensive typing and scheduling, which requires prolonged periods of sitting.     She also reports an episode of right eye watering and puffiness, which she initially attributed to allergies. She has been using eye drops for relief. She reports no changes in strength, unexplained fatigue, or oral ulcers. She has recently initiated an exercise regimen due to increased body stiffness. She continues to experience ankle issues, characterized by intermittent throbbing pain without noticeable swelling. She has been engaging in walking and stretching exercises and is mindful of her diet. She maintains good oral hygiene and has found relief from diclofenac topical medication for her ankle pain.    Rheumatology History:  No prior diagnosis of autoimmune or rheumatologic condition.  Negative review for: Red eye (uveitis), Psoriasis, IBD, Inflammatory back pain, Plantar fasciitis/Achilles tendinitis, dactylitis or enthesitis  2024  RF negative  CCP 52  JAS negative  2023  Ferritin-232 elevated  Pertinent imaging:  MRI chest including status post bilateral 2024     IMPRESSION:  Advanced arthritis at the right sternoclavicular joint where there is joint space loss and surrounding bone  marrow edema. There is also dorsomedial subluxation of the right clavicular head at the sternomanubrial joint which may account for the palpable abnormality. Right sternoclavicular joint effusion with synovitis.   This report was finalized on 12/4/2024        Results  Labs   - Creatinine level: 1.16   - Protein creatinine ratio: Normal    Outpatient Medications Prior to Visit   Medication Sig Dispense Refill    albuterol sulfate  (90 Base) MCG/ACT inhaler Inhale 2 puffs Every 4 (Four) Hours As Needed for Wheezing. 8 g 6    amantadine (SYMMETREL) 100 MG tablet Take 1 tablet by mouth 2 (Two) Times a Day.      Cream Base (Salt Stable LS Advanced) cream       Ibuprofen 3 %, Baclofen 2 %, lidocaine 4 % Apply 1-2 g topically to the appropriate area as directed 3 (Three) to 4 (Four) times daily. 240 g 3    levalbuterol (XOPENEX HFA) 45 MCG/ACT inhaler Inhale 2 puffs Every 6 (Six) Hours As Needed for Wheezing.      levocetirizine (XYZAL) 5 MG tablet Take 1 tablet by mouth Daily.      mometasone (NASONEX) 50 MCG/ACT nasal spray       NON FORMULARY Allergy Shots-weekly       No facility-administered medications prior to visit.     Physical Exam  Gen: Well-developed, well-nourished adult in no apparent distress. Pleasant and cooperative. Alert and oriented.   HEENT: EOMI, MMM, oropharynx clear without oral ulcers, no lacrimal gland enlargement, normal salivary pooling, normal temporal arteries without tenderness or beading.  Chest: Normal work of breathing. CTAB without wheezing/rhonchi/rales. ??  CV: RRR, normal S1/S2, no murmurs/rubs/gallops heard. ??  Extremities: Warm, 2+ distal pulses, no cyanosis, clubbing, or edema.  Neuro: Good comprehension/cognition. Muscle strength 5/5 in all extremities. Gait normal.??  ??Skin: No alopecia, nail change (including no nail pitting), rashes, bruising, petechiae, sclerodactyly, digital pits, telangiectasias, tophi, appreciable calcinosis, or nodules.??    Comprehensive  Musculoskeletal Examination:?  - Jaw, neck without limited ROM.?  -sternoclavicular joint: right SC joint is non-tender, no erythema, with a bony prominence and suspected subluxation.  Left SC joint is wnl  - Shoulders, elbows, wrists, knees, ankles, feet/toes: No deformity, erythema, warmth, swelling, effusion, tenderness, or limited ROM.??  - PIP joints of both hands: Tender to touch  - Left middle finger: Tender to touch  - Right ring finger: Tender to touch  - Lumbosacral spine and hips without limited ROM.?? Negative BLANQUITA.    Assessment & Plan  Maranda Gillis is a 55 y.o. female presents with right sternoclavicular joint (SCJ) arthritis, confirmed by MRI showing joint narrowing and surrounding edema. The elevated cyclic citrullinated peptide (CCP) of 52, in the context of a normal rheumatoid factor (RF) and absence of clinical synovitis or arthritis in the hands and feet, suggests a possible early/subclinical disease or atypical presentation of rheumatoid arthritis (RA) or another inflammatory arthropathy.  On the other hand, the positive HLA-B27 may also suggest a spondyloarthropathy, potentially ankylosing spondylitis (AS) or another form of axial spondyloarthritis (axSpA)     The potential benefits of methotrexate were discussed, but she prefers to avoid medication at this time due to concerns about her kidney and liver health.    Plan:  - Follow up in 6 months.  - Consult an ophthalmologist for eye evaluation.  - Continue using diclofenac topical medication as needed.  - Maintain a Mediterranean diet.  - Engage in regular exercise and stretching routines.  - Monitor for any swelling or discomfort and seek medical attention if symptoms occur.       Follow-up: The patient will follow up in 6 months.    Diagnoses and all orders for this visit:    1. Subclinical RA disease or syndrome (Primary)    2. Sternoclavicular joint pain, right    3. HLA B27 (HLA B27 positive)       Return in about 6 months (around  11/29/2025).      I spent 25 minutes caring for Maranda on this date of service. This time includes time spent by me in the following activities:preparing for the visit, reviewing tests, obtaining and/or reviewing a separately obtained history, performing a medically appropriate examination and/or evaluation , counseling and educating the patient/family/caregiver, documenting information in the medical record, and independently interpreting results and communicating that information with the patient/family/caregiver

## 2025-06-20 ENCOUNTER — ANESTHESIA EVENT (OUTPATIENT)
Dept: PERIOP | Facility: HOSPITAL | Age: 55
End: 2025-06-20
Payer: COMMERCIAL

## 2025-06-23 ENCOUNTER — HOSPITAL ENCOUNTER (OUTPATIENT)
Facility: HOSPITAL | Age: 55
Setting detail: HOSPITAL OUTPATIENT SURGERY
Discharge: HOME | End: 2025-06-23
Attending: STUDENT IN AN ORGANIZED HEALTH CARE EDUCATION/TRAINING PROGRAM | Admitting: STUDENT IN AN ORGANIZED HEALTH CARE EDUCATION/TRAINING PROGRAM
Payer: COMMERCIAL

## 2025-06-23 ENCOUNTER — ANESTHESIA (OUTPATIENT)
Dept: PERIOP | Facility: HOSPITAL | Age: 55
End: 2025-06-23
Payer: COMMERCIAL

## 2025-06-23 VITALS
DIASTOLIC BLOOD PRESSURE: 90 MMHG | HEIGHT: 65 IN | HEART RATE: 62 BPM | WEIGHT: 180.2 LBS | RESPIRATION RATE: 24 BRPM | SYSTOLIC BLOOD PRESSURE: 124 MMHG | BODY MASS INDEX: 30.02 KG/M2 | TEMPERATURE: 97.8 F | OXYGEN SATURATION: 98 %

## 2025-06-23 DIAGNOSIS — Z86.0100 HISTORY OF COLON POLYPS: ICD-10-CM

## 2025-06-23 DIAGNOSIS — Z12.11 ENCOUNTER FOR SCREENING COLONOSCOPY: ICD-10-CM

## 2025-06-23 PROCEDURE — 45378 DIAGNOSTIC COLONOSCOPY: CPT | Performed by: STUDENT IN AN ORGANIZED HEALTH CARE EDUCATION/TRAINING PROGRAM

## 2025-06-23 PROCEDURE — 25010000002 PROPOFOL 200 MG/20ML EMULSION: Performed by: NURSE ANESTHETIST, CERTIFIED REGISTERED

## 2025-06-23 PROCEDURE — 25810000003 LACTATED RINGERS PER 1000 ML

## 2025-06-23 RX ORDER — DIPHENHYDRAMINE HYDROCHLORIDE 50 MG/ML
12.5 INJECTION, SOLUTION INTRAMUSCULAR; INTRAVENOUS
Status: DISCONTINUED | OUTPATIENT
Start: 2025-06-23 | End: 2025-06-23 | Stop reason: HOSPADM

## 2025-06-23 RX ORDER — PROPOFOL 10 MG/ML
INJECTION, EMULSION INTRAVENOUS AS NEEDED
Status: DISCONTINUED | OUTPATIENT
Start: 2025-06-23 | End: 2025-06-23 | Stop reason: SURG

## 2025-06-23 RX ORDER — SODIUM CHLORIDE 0.9 % (FLUSH) 0.9 %
10 SYRINGE (ML) INJECTION AS NEEDED
Status: DISCONTINUED | OUTPATIENT
Start: 2025-06-23 | End: 2025-06-23 | Stop reason: HOSPADM

## 2025-06-23 RX ORDER — SODIUM CHLORIDE, SODIUM LACTATE, POTASSIUM CHLORIDE, CALCIUM CHLORIDE 600; 310; 30; 20 MG/100ML; MG/100ML; MG/100ML; MG/100ML
30 INJECTION, SOLUTION INTRAVENOUS CONTINUOUS PRN
Status: DISCONTINUED | OUTPATIENT
Start: 2025-06-23 | End: 2025-06-23 | Stop reason: HOSPADM

## 2025-06-23 RX ORDER — SODIUM CHLORIDE 0.9 % (FLUSH) 0.9 %
10 SYRINGE (ML) INJECTION EVERY 12 HOURS SCHEDULED
Status: DISCONTINUED | OUTPATIENT
Start: 2025-06-23 | End: 2025-06-23 | Stop reason: HOSPADM

## 2025-06-23 RX ORDER — ONDANSETRON 2 MG/ML
4 INJECTION INTRAMUSCULAR; INTRAVENOUS ONCE AS NEEDED
Status: DISCONTINUED | OUTPATIENT
Start: 2025-06-23 | End: 2025-06-23 | Stop reason: HOSPADM

## 2025-06-23 RX ORDER — SODIUM CHLORIDE 9 MG/ML
40 INJECTION, SOLUTION INTRAVENOUS AS NEEDED
Status: DISCONTINUED | OUTPATIENT
Start: 2025-06-23 | End: 2025-06-23 | Stop reason: HOSPADM

## 2025-06-23 RX ORDER — SODIUM CHLORIDE 0.9 % (FLUSH) 0.9 %
3 SYRINGE (ML) INJECTION EVERY 12 HOURS SCHEDULED
Status: DISCONTINUED | OUTPATIENT
Start: 2025-06-23 | End: 2025-06-23 | Stop reason: HOSPADM

## 2025-06-23 RX ORDER — LIDOCAINE HYDROCHLORIDE 10 MG/ML
0.5 INJECTION, SOLUTION EPIDURAL; INFILTRATION; INTRACAUDAL; PERINEURAL ONCE AS NEEDED
Status: DISCONTINUED | OUTPATIENT
Start: 2025-06-23 | End: 2025-06-23 | Stop reason: HOSPADM

## 2025-06-23 RX ORDER — SODIUM CHLORIDE, SODIUM LACTATE, POTASSIUM CHLORIDE, CALCIUM CHLORIDE 600; 310; 30; 20 MG/100ML; MG/100ML; MG/100ML; MG/100ML
9 INJECTION, SOLUTION INTRAVENOUS CONTINUOUS
Status: DISCONTINUED | OUTPATIENT
Start: 2025-06-23 | End: 2025-06-23 | Stop reason: HOSPADM

## 2025-06-23 RX ADMIN — SODIUM CHLORIDE, POTASSIUM CHLORIDE, SODIUM LACTATE AND CALCIUM CHLORIDE 9 ML/HR: 600; 310; 30; 20 INJECTION, SOLUTION INTRAVENOUS at 09:30

## 2025-06-23 RX ADMIN — PROPOFOL 100 MG: 10 INJECTION, EMULSION INTRAVENOUS at 09:57

## 2025-06-23 RX ADMIN — PROPOFOL 50 MG: 10 INJECTION, EMULSION INTRAVENOUS at 10:10

## 2025-06-23 RX ADMIN — PROPOFOL 50 MG: 10 INJECTION, EMULSION INTRAVENOUS at 10:00

## 2025-06-23 RX ADMIN — PROPOFOL 50 MG: 10 INJECTION, EMULSION INTRAVENOUS at 10:05

## 2025-06-23 NOTE — ANESTHESIA POSTPROCEDURE EVALUATION
Patient: Maranda Gillis    Procedure Summary       Date: 06/23/25 Room / Location: [facility] ENDOSCOPY 2 /  [facility]    Anesthesia Start: 0952 Anesthesia Stop: 1014    Procedure: COLONOSCOPY FOR SCREENING Diagnosis:       Encounter for screening colonoscopy      History of colon polyps      (Encounter for screening colonoscopy [Z12.11])      (History of colon polyps [Z86.0100])    Surgeons: Colten Rocha MD Provider: Srinivasa Torres CRNA    Anesthesia Type: MAC ASA Status: 2            Anesthesia Type: MAC    Vitals  Vitals Value Taken Time   /82 06/23/25 10:40   Temp 97.8 °F (36.6 °C) 06/23/25 10:17   Pulse 69 06/23/25 10:47   Resp 12 06/23/25 10:40   SpO2 98 % 06/23/25 10:47   Vitals shown include unfiled device data.        Post Anesthesia Care and Evaluation    Patient location during evaluation: bedside  Patient participation: complete - patient participated  Level of consciousness: awake and alert  Pain score: 0  Pain management: adequate    Airway patency: patent  Anesthetic complications: No anesthetic complications  PONV Status: none  Cardiovascular status: acceptable  Respiratory status: acceptable  Hydration status: acceptable  No anesthesia care post op

## 2025-06-23 NOTE — ANESTHESIA PREPROCEDURE EVALUATION
Anesthesia Evaluation     Patient summary reviewed and Nursing notes reviewed   no history of anesthetic complications:   NPO Solid Status: > 8 hours  NPO Liquid Status: > 8 hours           Airway   Mallampati: II  TM distance: >3 FB  Neck ROM: full  No difficulty expected  Dental          Pulmonary     breath sounds clear to auscultation  (+) ,sleep apnea  Cardiovascular - negative cardio ROS  Exercise tolerance: good (4-7 METS)    ECG reviewed  Rhythm: regular  Rate: normal        Neuro/Psych  (+) headaches ( Migraines- wiht OCPS), syncope (not in 6 months due to UTI, low BP. none since)  GI/Hepatic/Renal/Endo    (+) renal disease-    Musculoskeletal (-) negative ROS    Abdominal    Substance History   (+) alcohol use (2x per week)     OB/GYN      Comment: Cyst of left ovary  Pelvic pain          Other - negative ROS       ROS/Med Hx Other:   Left ventricular systolic function is normal. Calculated left ventricular EF = 64.5% Normal left ventricular cavity size and wall thickness noted. All left ventricular wall segments contract normally. Left ventricular diastolic function is consistent with (grade I) impaired relaxation.  ·  Trace mitral valve regurgitation is present.  ·  Mild tricuspid valve regurgitation is present. Estimated right ventricular systolic pressure from tricuspid regurgitation is normal (<35 mmHg). Calculated right ventricular systolic pressure from tricuspid regurgitation is 25.3 mmHg.                     Anesthesia Plan    ASA 2     MAC     intravenous induction     Anesthetic plan, risks, benefits, and alternatives have been provided, discussed and informed consent has been obtained with: patient.    Use of blood products discussed with patient  Consented to blood products.

## 2025-06-23 NOTE — H&P
Patient Care Team:  Ani Vazquez APRN as PCP - General (Family Medicine)  Jaci Corado APRN as Nurse Practitioner (Gastroenterology)    CHIEF COMPLAINT: Personal hx colon polyps    HISTORY OF PRESENT ILLNESS:  C/s for personal history of colon polyps    Past Medical History:   Diagnosis Date    Abnormal ECG July 23, 2023    Allergic     Arthritis 2024    Colon polyp     Diverticulosis October 2019    HL (hearing loss)     Migraine     with OCPS    NAZARIO (obstructive sleep apnea)     Ovarian cyst     Renal insufficiency     Possibly elevated levels in kidney-seeing doctor this month for a followup    Shoulder injury     Possible injury to collarbone    Vitamin D deficiency      Past Surgical History:   Procedure Laterality Date    BUNIONECTOMY      bilateral    COLONOSCOPY N/A 10/14/2019    Procedure: COLONOSCOPY with polypectomy;  Surgeon: Claudette Liu MD;  Location: [facility];  Service: Gastroenterology    SKIN LESION EXCISION       Family History   Problem Relation Age of Onset    Diabetes Mother         Type 2    Melanoma Mother     Cancer Mother         Melanoma metastatic-passed away 1 year after diagnosed    COPD Father         Passed away from this disease in 2013    Emphysema Father     Asthma Sister     Cancer Maternal Grandfather         Melanoma    Cancer Maternal Aunt         Lung cancer    Cancer Maternal Uncle         Not sure hich kind of cancer, possibly lung.    Breast cancer Neg Hx     Ovarian cancer Neg Hx     Colon cancer Neg Hx     Deep vein thrombosis Neg Hx     Pulmonary embolism Neg Hx      Social History     Tobacco Use    Smoking status: Never    Smokeless tobacco: Never    Tobacco comments:     3-4 Cups/ Day    Vaping Use    Vaping status: Never Used   Substance Use Topics    Alcohol use: Yes     Alcohol/week: 2.0 standard drinks of alcohol     Types: 1 Glasses of wine, 1 Drinks containing 0.5 oz of alcohol per week     Comment: SOCIALLY    Drug use: Never     Medications Prior to  "Admission   Medication Sig Dispense Refill Last Dose/Taking    levocetirizine (XYZAL) 5 MG tablet Take 1 tablet by mouth Daily.   6/22/2025 at  8:00 AM    albuterol sulfate  (90 Base) MCG/ACT inhaler Inhale 2 puffs Every 4 (Four) Hours As Needed for Wheezing. 8 g 6 More than a month    amantadine (SYMMETREL) 100 MG tablet Take 1 tablet by mouth 2 (Two) Times a Day.   Unknown    Cream Base (Salt Stable LS Advanced) cream    Unknown    Ibuprofen 3 %, Baclofen 2 %, lidocaine 4 % Apply 1-2 g topically to the appropriate area as directed 3 (Three) to 4 (Four) times daily. 240 g 3 Unknown    levalbuterol (XOPENEX HFA) 45 MCG/ACT inhaler Inhale 2 puffs Every 6 (Six) Hours As Needed for Wheezing.   More than a month    mometasone (NASONEX) 50 MCG/ACT nasal spray    More than a month    NON FORMULARY Allergy Shots-weekly   Unknown     Allergies:  Montelukast, Other, Singulair [montelukast sodium], and Celebrex [celecoxib]    REVIEW OF SYSTEMS:  Please see the above history of present illness for pertinent positives and negatives.  The remainder of the patient's systems have been reviewed and are negative.     Vital Signs  Temp:  [97.9 °F (36.6 °C)] 97.9 °F (36.6 °C)  Heart Rate:  [71] 71  Resp:  [13] 13  BP: (115)/(74) 115/74    Flowsheet Rows      Flowsheet Row First Filed Value   Admission Height 165.1 cm (65\") Documented at 06/20/2025 1652   Admission Weight 81.7 kg (180 lb 3.2 oz) Documented at 06/23/2025 0916             Physical Exam:  Physical Exam   Constitutional: Patient appears well-developed and well-nourished and in no acute distress   HEENT:   Head: Normocephalic and atraumatic.   Eyes:  Pupils are equal, round, and reactive to light. EOM are intact. Sclerae are anicteric and non-injected.  Mouth and Throat: Patient has moist mucous membranes. Oropharynx is clear of any erythema or exudate.     Neck: Neck supple. No JVD present. No thyromegaly present. No lymphadenopathy present.  Cardiovascular: Regular " rate, regular rhythm, S1 normal and S2 normal.  Exam reveals no gallop and no friction rub.  No murmur heard.  Pulmonary/Chest: Lungs are clear to auscultation bilaterally. No respiratory distress. No wheezes. No rhonchi. No rales.   Abdominal: Soft. Bowel sounds are normal. No distension and no mass. There is no hepatosplenomegaly. There is no tenderness.   Musculoskeletal: Normal Muscle tone  Extremities: No edema. Pulses are palpable in all 4 extremities.  Neurological: Patient is alert and oriented to person, place, and time. Cranial nerves II-XII are grossly intact with no focal deficits.  Skin: Skin is warm. No rash noted. Nails show no clubbing.  No cyanosis or erythema.    Debilities/Disabilities Identified: None  Emotional Behavior: Appropriate     Results Review:   I reviewed the patient's new clinical results.    Lab Results (most recent)       None            Imaging Results (Most Recent)       None          reviewed    ECG/EMG Results (most recent)       None          reviewed    Assessment & Plan   Personal hx colon polyps/  colonoscopy      I discussed the patient's findings and my recommendations with patient.     Colten Rocha MD  06/23/25  09:46 EDT    Time: 10 min prior to procedure.

## 2025-07-05 ENCOUNTER — PATIENT MESSAGE (OUTPATIENT)
Dept: GASTROENTEROLOGY | Facility: CLINIC | Age: 55
End: 2025-07-05
Payer: COMMERCIAL

## (undated) DEVICE — SUCTION CANISTER, 3000CC,SAFELINER: Brand: DEROYAL

## (undated) DEVICE — ADAPT CLN BIOGUARD AIR/H2O DISP

## (undated) DEVICE — Device: Brand: DEFENDO AIR/WATER/SUCTION AND BIOPSY VALVE

## (undated) DEVICE — SYR LL 3CC

## (undated) DEVICE — Device

## (undated) DEVICE — JACKT LAB KNIT COLR LG BLU

## (undated) DEVICE — FRCP BX RADJAW4 NDL 2.8 240CM LG OG BX40

## (undated) DEVICE — SPNG GZ WOVN 4X4IN 12PLY 10/BX STRL

## (undated) DEVICE — BW-412T DISP COMBO CLEANING BRUSH: Brand: SINGLE USE COMBINATION CLEANING BRUSH

## (undated) DEVICE — VIAL FORMALIN CAP 10P 40ML

## (undated) DEVICE — GOWN ISOL W/THUMB UNIV BLU BX/15

## (undated) DEVICE — MASK,FACE,FLUID RESIST,SHLD,EARLOOP: Brand: MEDLINE

## (undated) DEVICE — GLV SURG SENSICARE MICRO PF LF 6 STRL

## (undated) DEVICE — SOL IRR H2O BO 1000ML STRL

## (undated) DEVICE — SYR LL W/SCALE/MARK 3ML STRL

## (undated) DEVICE — KT ORCA ORCAPOD DISP STRL

## (undated) DEVICE — LINER SURG CANSTR SXN S/RIGD 1500CC